# Patient Record
Sex: MALE | Race: WHITE | NOT HISPANIC OR LATINO | Employment: OTHER | URBAN - NONMETROPOLITAN AREA
[De-identification: names, ages, dates, MRNs, and addresses within clinical notes are randomized per-mention and may not be internally consistent; named-entity substitution may affect disease eponyms.]

---

## 2019-07-16 ENCOUNTER — OFFICE VISIT (OUTPATIENT)
Dept: CARDIOLOGY | Facility: CLINIC | Age: 79
End: 2019-07-16

## 2019-07-16 VITALS — WEIGHT: 206.8 LBS | OXYGEN SATURATION: 95 % | BODY MASS INDEX: 25.71 KG/M2 | HEIGHT: 75 IN

## 2019-07-16 DIAGNOSIS — R00.2 PALPITATIONS: ICD-10-CM

## 2019-07-16 DIAGNOSIS — R55 SYNCOPE, NEAR: ICD-10-CM

## 2019-07-16 DIAGNOSIS — R42 DIZZINESS: ICD-10-CM

## 2019-07-16 DIAGNOSIS — R06.02 SOB (SHORTNESS OF BREATH): Primary | ICD-10-CM

## 2019-07-16 PROCEDURE — 99204 OFFICE O/P NEW MOD 45 MIN: CPT | Performed by: PHYSICIAN ASSISTANT

## 2019-07-16 PROCEDURE — 93000 ELECTROCARDIOGRAM COMPLETE: CPT | Performed by: PHYSICIAN ASSISTANT

## 2019-07-16 RX ORDER — CELECOXIB 200 MG/1
200 CAPSULE ORAL DAILY
COMMUNITY

## 2019-07-16 RX ORDER — DOXAZOSIN MESYLATE 4 MG/1
4 TABLET ORAL DAILY
COMMUNITY
End: 2021-04-27

## 2019-07-16 RX ORDER — OMEPRAZOLE 40 MG/1
40 CAPSULE, DELAYED RELEASE ORAL DAILY
COMMUNITY

## 2019-07-16 RX ORDER — LANOLIN ALCOHOL/MO/W.PET/CERES
1 CREAM (GRAM) TOPICAL DAILY
COMMUNITY

## 2019-07-16 RX ORDER — FINASTERIDE 5 MG/1
5 TABLET, FILM COATED ORAL DAILY
COMMUNITY

## 2019-07-16 NOTE — PROGRESS NOTES
Subjective   Marcos Ordaz is a 79 y.o. male     Chief Complaint   Patient presents with   • Establish Care   • Syncope   • Chest Pain   • Shortness of Breath       HPI    The patient presents in today for initial evaluation.  The patient is referred to the clinic in the setting of exertional dizziness and exertional dyspnea.  The patient denies cardiovascular history.  He has chronic issues with orthostasis/dizziness.  With sudden position change when first standing in the morning, he will have lightheadedness.  This lasts only several seconds and resolves completely.  Current exertional episodes are completely different than those.  He tells me that when exerting, particularly when outside in the heat, he will have onset of weakness.  He then develops dizziness.  He has had no syncope with that.  Occasionally he will have smothering but reports no chest discomfort or tightness.  His symptoms persist until he rests.  He must rest for several minutes and symptoms will resolve spontaneously.  He has some degree of mild weakness which is residual, but this eventually resolves over several minutes as well.  Occasionally, he will have associated palpitations.  Otherwise, the patient has had no PND orthopnea.  He has never had robin syncope.  He reports recent laboratories including a chemistry panel, hematologic parameters, and thyroid studies which were normal.  Lipid parameters were normal as well.  The patient did have orthostatic blood pressure checks today which suggest a mild degree of orthostasis.  Again symptomatically the patient tells me this is nonproblematic for him.    Current Outpatient Medications   Medication Sig Dispense Refill   • aspirin 81 MG tablet Take 81 mg by mouth Daily.     • celecoxib (CeleBREX) 200 MG capsule Take 200 mg by mouth Daily.     • doxazosin (CARDURA) 4 MG tablet Take 4 mg by mouth Daily.     • finasteride (PROSCAR) 5 MG tablet Take 5 mg by mouth Daily.     • Glucosamine-Chondroit-Vit  C-Mn (GLUCOSAMINE 1500 COMPLEX PO) Take 1 tablet by mouth Daily.     • omeprazole (priLOSEC) 40 MG capsule Take 40 mg by mouth Daily.     • vitamin B-12 (CYANOCOBALAMIN) 1000 MCG tablet Take 1 tablet by mouth Daily.       No current facility-administered medications for this visit.        Codeine    Past Medical History:   Diagnosis Date   • Elevated prostate specific antigen (PSA)        Social History     Socioeconomic History   • Marital status:      Spouse name: Not on file   • Number of children: Not on file   • Years of education: Not on file   • Highest education level: Not on file   Tobacco Use   • Smoking status: Former Smoker     Packs/day: 1.00     Years: 20.00     Pack years: 20.00     Types: Cigarettes   • Smokeless tobacco: Current User     Types: Chew   Substance and Sexual Activity   • Alcohol use: No     Frequency: Never   • Drug use: No   • Sexual activity: Defer       No family history on file.    Review of Systems   Constitutional: Positive for fatigue (easily fatigued).   HENT: Negative.  Negative for congestion, rhinorrhea and sneezing.    Eyes: Positive for visual disturbance (wears glasses PRN).   Respiratory: Positive for chest tightness (chest tightness at times) and shortness of breath (easily SOA ; worse on exertion ). Negative for cough and wheezing.    Cardiovascular: Positive for chest pain (precordial pain). Negative for palpitations and leg swelling.   Gastrointestinal: Negative.  Negative for abdominal pain, nausea and vomiting.   Endocrine: Negative.  Negative for cold intolerance and heat intolerance.   Genitourinary: Negative.  Negative for difficulty urinating, frequency and urgency.   Musculoskeletal: Positive for arthralgias (joints) and back pain (back pain from arthritis). Negative for neck pain and neck stiffness.   Skin: Negative.  Negative for rash and wound.   Allergic/Immunologic: Negative.  Negative for environmental allergies and food allergies.   Neurological:  "Positive for dizziness (occasional dizziness) and light-headedness (occasional light headedness). Negative for syncope, weakness and headaches.   Hematological: Bruises/bleeds easily (bruises and bleeds easily).   Psychiatric/Behavioral: Positive for agitation (easily agitated). Negative for confusion and sleep disturbance (denies waking up smothering/SOA). The patient is nervous/anxious (easily nervous/anxious).        Objective     Vitals:    07/16/19 1354 07/16/19 1357 07/16/19 1359   SpO2: 98% 96% 95%   Weight: 93.8 kg (206 lb 12.8 oz)     Height: 190.5 cm (75\")          Ht 190.5 cm (75\")   Wt 93.8 kg (206 lb 12.8 oz)   SpO2 95%   BMI 25.85 kg/m²      Lab Results (most recent)     None          Physical Exam   Constitutional: He is oriented to person, place, and time. He appears well-developed and well-nourished. No distress.   HENT:   Head: Normocephalic and atraumatic.   Eyes: Conjunctivae and EOM are normal. Pupils are equal, round, and reactive to light.   Neck: Normal range of motion. Neck supple. No JVD present. No tracheal deviation present.   Cardiovascular: Normal rate, regular rhythm, normal heart sounds and intact distal pulses.   Pulmonary/Chest: Effort normal and breath sounds normal.   Abdominal: Soft. Bowel sounds are normal. He exhibits no distension and no mass. There is no tenderness. There is no rebound and no guarding.   Musculoskeletal: Normal range of motion. He exhibits no edema, tenderness or deformity.   Neurological: He is alert and oriented to person, place, and time.   Skin: Skin is warm and dry. No rash noted. No erythema. No pallor.   Psychiatric: He has a normal mood and affect. His behavior is normal. Judgment and thought content normal.   Nursing note and vitals reviewed.      Procedure     ECG 12 Lead  Date/Time: 7/16/2019 2:09 PM  Performed by: Raheem Tellez PA  Authorized by: Raheem Tellez PA   Previous ECG: no previous ECG available  Comments: SOA    Sinus rhythm " with PACs, normal axis, no acute changes noted.                 Assessment/Plan      Diagnosis Plan   1. SOB (shortness of breath)  ECG 12 Lead    Adult Transthoracic Echo Complete W/ Cont if Necessary Per Protocol    Holter Monitor - 24 Hour    Stress Test With Myocardial Perfusion One Day   2. Syncope, near  ECG 12 Lead    Adult Transthoracic Echo Complete W/ Cont if Necessary Per Protocol    Holter Monitor - 24 Hour    Stress Test With Myocardial Perfusion One Day   3. Palpitations  Adult Transthoracic Echo Complete W/ Cont if Necessary Per Protocol    Holter Monitor - 24 Hour    Stress Test With Myocardial Perfusion One Day   4. Dizziness  Adult Transthoracic Echo Complete W/ Cont if Necessary Per Protocol    Holter Monitor - 24 Hour    Stress Test With Myocardial Perfusion One Day     1.  Because of symptoms of exertional dizziness as well as exertional dyspnea, I feel he needs ischemia assessment.  We will schedule for treadmill nuclear stress test which will allow us to evaluate for ischemia, but also allow us to screen for dysrhythmic substrates occurring with exertion.  We can evaluate his exertional capacity and response to activity at that time as well by telemetry.    2.  I would like to schedule for an echo so that we may evaluate him structurally, particularly given symptoms as above.      3.  We will schedule for Holter to evaluate for dysrhythmic substrates potentially contributing to symptoms.    4.  I did review that by check today, he is mildly orthostatic.  I feel that this is likely related to his alpha blockade therapy.  He feels that that is minimal enough and that he is benefiting enough from the doxazosin with regards to his BPH that he would not want to change that.  Should that worsen, we reviewed that he could go down to a 1 mg dose, as he is currently taking 4 mg half a tab daily.  He acknowledges that he will contact us or Dr. Nielson should those symptoms worsen.    5.  For now, I will  make no changes in medications.  We will see him back after the above studies and recommended further at that time.  He will call for any issues prior to follow-up.               Marcos Ordaz is a current smokeless tobacco user.  He currently smokes and chews 1 pack of cigarettes and can of smokless tobacco per day for a duration of 15 years. I have educated him on the risk of diseases from using tobacco products such as cancer, COPD and heart diease.     I advised him to quit and he is not willing to quit.    I spent 3  minutes counseling the patient.         Patient's Body mass index is 25.85 kg/m². BMI is above normal parameters. Recommendations include: educational material and referral to primary care.           Electronically signed by:

## 2019-07-16 NOTE — PATIENT INSTRUCTIONS
Smokeless Tobacco Information, Adult  Tobacco use is one of the leading causes of cancer and other chronic health problems. Smokeless tobacco is tobacco that is put directly into the mouth instead of being smoked. It may also be called chewing tobacco or snuff. Smokeless tobacco is made from the leaves of tobacco plants and it comes in several forms:  · Loose, dry leaves, plugs, or twists.  · Moist pouches.  · Dissolving lozenges or strips.    Chewing, sucking, or holding the tobacco in your mouth causes your mouth to make more saliva. The saliva mixes with the tobacco to make “tobacco juice” that is swallowed or spit out.  How can smokeless tobacco affect me?  Using smokeless tobacco:  · Increases your risk of developing cancer. Smokeless tobacco contains at least 28 different types of cancer-causing chemicals (carcinogens).  · Increases your chances of developing other long-term health problems, including high blood pressure, heart disease, stroke, and dental problems.  · Can make you become addicted. Nicotine is one of the chemicals in tobacco. When you chew tobacco, you absorb nicotine from the tobacco juice. This can make you feel more alert than usual.  · Can cause problems with pregnancy. Pregnant women who use smokeless tobacco are more likely to miscarry or deliver a baby too early (premature delivery).  · Can affect the appearance and health of your mouth. Using smokeless tobacco may cause bad breath, yellow-brown teeth, mouth sores, cracking and bleeding lips, gum recession, and lesions on the soft tissues of your mouth (leukoplakia).    What are the benefits of not using smokeless tobacco?  The benefits of not using smokeless tobacco include:  · A healthy mind because:  ? You avoid addiction.  · A healthy body because:  ? You avoid dental problems.  ? You promote healthy pregnancy.  ? You avoid long-term health problems.  · A healthy wallet because:  ? You avoid costs of buying tobacco.  ? You avoid  health care costs in the future.  · A healthy family because:  ? You avoid accidental poisoning of children in your household.    What can happen if I continue to use smokeless tobacco?  If you continue to use smokeless tobacco, you will increase your risk for developing certain cancers. These include:  · Tongue.  · Lips, mouth, and gums.  · Throat (esophagus) and voice box (larynx).  · Stomach.  · Pancreas.  · Bladder.  · Colon.    Long-term use of smokeless tobacco can also lead to:  · High blood pressure, heart disease, and stroke.  · Gum disease, gum recession, and bone loss around the teeth.  · Tooth decay.    How do I quit using smokeless tobacco?  Quitting the use of smokeless tobacco can be hard, but it can be done. Follow these steps:  · Pick a date to quit. Set a date within the next two weeks. This gives you time to prepare.  · Write down the reasons why you are quitting. Keep this list in places where you will see it often, such as on your bathroom mirror or in your car or wallet.  · Identify the people, places, things, and activities that make you want to use tobacco (triggers) and avoid them.  · Get rid of any tobacco you have and remove any tobacco smells. To do this:  ? Throw away all containers of tobacco at home, at work, and in your car.  ? Throw away any other items that you use regularly when you chew tobacco.  ? Clean your car and make sure to remove all tobacco-related items.  ? Clean your home, including curtains and carpets.  · Tell your family, friends, and coworkers that you are quitting. This can make quitting easier.  · Ask your health care provider for help quitting smokeless tobacco. This may involve treatment. Find out what treatment options are covered by your health insurance.  · Keep track of how many days have passed since you quit. Remembering how long and hard you have worked to quit can help you avoid using tobacco again.    Where can I get support?  Ask your health care  provider if there is a local support group for quitting smokeless tobacco.  Where can I get more information?  You can learn more about the risks of using smokeless tobacco and the benefits of quitting from these sources:  · National Cancer [place]: www.cancer.gov  · American Cancer Society: www.cancer.org    When should I seek medical care?  Seek medical care if you have:  · White or other discolored patches in your mouth.  · Difficulty swallowing.  · A change in your voice.  · Unexplained weight loss.  · Stomach pain, nausea, or vomiting.    Summary  · Smokeless tobacco contains at least 28 different chemicals that are known to cause cancer (carcinogen).  · Nicotine is an addictive chemical in smokeless tobacco.  · When you quit using smokeless tobacco, you lower your risk of developing cancer.  This information is not intended to replace advice given to you by your health care provider. Make sure you discuss any questions you have with your health care provider.  Document Released: 05/21/2012 Document Revised: 08/03/2018 Document Reviewed: 07/29/2016  [employer] Interactive Patient Education © 2019 [employer]  BMI for Adults  Body mass index (BMI) is a number that is calculated from a person's weight and height. In most adults, the number is used to find how much of an adult's weight is made up of fat. BMI is not as accurate as a direct measure of body fat.  How is BMI calculated?  BMI is calculated by dividing weight in kilograms by height in meters squared. It can also be calculated by dividing weight in pounds by height in inches squared, then multiplying the resulting number by 703. Charts are available to help you find your BMI quickly and easily without doing this calculation.  How is BMI interpreted?  Health care professionals use BMI charts to identify whether an adult is underweight, at a normal weight, or overweight based on the following guidelines:  · Underweight: BMI less than 18.5.  · Normal  weight: BMI between 18.5 and 24.9.  · Overweight: BMI between 25 and 29.9.  · Obese: BMI of 30 and above.    BMI is usually interpreted the same for males and females.  Weight includes both fat and muscle, so someone with a muscular build, such as an athlete, may have a BMI that is higher than 24.9. In cases like these, BMI may not accurately depict body fat. To determine if excess body fat is the cause of a BMI of 25 or higher, further assessments may need to be done by a health care provider.  Why is BMI a useful tool?  BMI is used to identify a possible weight problem that may be related to a medical problem or may increase the risk for medical problems. BMI can also be used to promote changes to reach a healthy weight.  This information is not intended to replace advice given to you by your health care provider. Make sure you discuss any questions you have with your health care provider.  Document Released: 08/29/2005 Document Revised: 04/27/2017 Document Reviewed: 05/15/2015  [employer] Interactive Patient Education © 2018 [employer]

## 2019-08-07 ENCOUNTER — HOSPITAL ENCOUNTER (OUTPATIENT)
Dept: CARDIOLOGY | Facility: HOSPITAL | Age: 79
Discharge: HOME | End: 2019-08-07

## 2019-08-07 DIAGNOSIS — R42 DIZZINESS: ICD-10-CM

## 2019-08-07 DIAGNOSIS — R00.2 PALPITATIONS: ICD-10-CM

## 2019-08-07 DIAGNOSIS — R06.02 SOB (SHORTNESS OF BREATH): ICD-10-CM

## 2019-08-07 DIAGNOSIS — R55 SYNCOPE, NEAR: ICD-10-CM

## 2019-08-07 PROCEDURE — 93306 TTE W/DOPPLER COMPLETE: CPT

## 2019-08-07 PROCEDURE — 0 TECHNETIUM SESTAMIBI: Performed by: INTERNAL MEDICINE

## 2019-08-07 PROCEDURE — A9500 TC99M SESTAMIBI: HCPCS | Performed by: INTERNAL MEDICINE

## 2019-08-07 PROCEDURE — 78452 HT MUSCLE IMAGE SPECT MULT: CPT | Performed by: INTERNAL MEDICINE

## 2019-08-07 PROCEDURE — 93018 CV STRESS TEST I&R ONLY: CPT | Performed by: INTERNAL MEDICINE

## 2019-08-07 PROCEDURE — 25010000002 REGADENOSON 0.4 MG/5ML SOLUTION: Performed by: INTERNAL MEDICINE

## 2019-08-07 PROCEDURE — 93306 TTE W/DOPPLER COMPLETE: CPT | Performed by: INTERNAL MEDICINE

## 2019-08-07 PROCEDURE — 78452 HT MUSCLE IMAGE SPECT MULT: CPT

## 2019-08-07 PROCEDURE — 93017 CV STRESS TEST TRACING ONLY: CPT

## 2019-08-07 RX ADMIN — TECHNETIUM TC 99M SESTAMIBI 1 DOSE: 1 INJECTION INTRAVENOUS at 12:09

## 2019-08-07 RX ADMIN — REGADENOSON 0.4 MG: 0.08 INJECTION, SOLUTION INTRAVENOUS at 13:27

## 2019-08-07 RX ADMIN — TECHNETIUM TC 99M SESTAMIBI 1 DOSE: 1 INJECTION INTRAVENOUS at 13:27

## 2019-08-12 ENCOUNTER — TELEPHONE (OUTPATIENT)
Dept: CARDIOLOGY | Facility: CLINIC | Age: 79
End: 2019-08-12

## 2019-08-12 LAB
BH CV NUCLEAR PRIOR STUDY: 3
BH CV STRESS COMMENTS STAGE 1: NORMAL
BH CV STRESS DOSE REGADENOSON STAGE 1: 0.4
BH CV STRESS DURATION MIN STAGE 1: 0
BH CV STRESS DURATION SEC STAGE 1: 10
BH CV STRESS PROTOCOL 1: NORMAL
BH CV STRESS RECOVERY BP: NORMAL MMHG
BH CV STRESS RECOVERY HR: 68 BPM
BH CV STRESS STAGE 1: 1
MAXIMAL PREDICTED HEART RATE: 140 BPM
PERCENT MAX PREDICTED HR: 56.43 %
STRESS BASELINE BP: NORMAL MMHG
STRESS BASELINE HR: 51 BPM
STRESS PERCENT HR: 66 %
STRESS POST PEAK BP: NORMAL MMHG
STRESS POST PEAK HR: 79 BPM
STRESS TARGET HR: 119 BPM

## 2019-08-12 NOTE — TELEPHONE ENCOUNTER
Patient returned call. He is aware of stress test results. Follow up moved to 8/14/19 at 11:15 to meet recommendations. Estefanía Alfaro MA

## 2019-08-12 NOTE — TELEPHONE ENCOUNTER
Patient called office back and was scheduled by Estefanía to come into office to review test results with provider. -BH;CCMA    Result Notes for Stress Test With Myocardial Perfusion One Day     Notes recorded by Britt Ramirez MA on 8/12/2019 at 10:53 AM EDT  Called patient and left VM to return call. Needs sooner appt, provider has opening at 130 PM today. -SERA;CCMA  Notes recorded by Britt Ramirez MA on 8/12/2019 at 9:44 AM EDT  Message sent to front office staff req sooner appt. -BH;CCMA  Notes recorded by Raheem Tellez PA on 8/12/2019 at 9:40 AM EDT  Consider follow-up in the next 2 to 3 weeks.   Stress Test With Myocardial Perfusion One Day   Order: [id number]   Status:  Final result   Visible to patient:  No (Not Released) Dx:  Palpitations; Dizziness; SOB (shortne...   Details     Reading Physician Reading Date Result Priority   Deny Samson MD 8/7/2019 Routine   Deny Samson MD 8/7/2019    Deny Samson MD 8/7/2019       Result Text     1.  Posterior lateral ischemia.     2.  Preserved post stress ejection fraction of 62% with no focal wall motion abnormalities.     3.  No evidence of pharmacologically-induced ischemic dilation nor of increased lung uptake of radiopharmaceutical.

## 2019-08-12 NOTE — TELEPHONE ENCOUNTER
Called patient and left voice mail to return call , needing patient to come into office sooner than scheduled due to abn stress test results. -BH;CCMA    Result Notes for Stress Test With Myocardial Perfusion One Day     Notes recorded by Britt Ramirez MA on 8/12/2019 at 9:44 AM EDT  Message sent to front office staff req sooner appt. -BH;CCMA  ------    Notes recorded by Raheem Tellez PA on 8/12/2019 at 9:40 AM EDT  Consider follow-up in the next 2 to 3 weeks.   Stress Test With Myocardial Perfusion One Day   Order: [id number]   Status:  Final result   Visible to patient:  No (Not Released) Dx:  Palpitations; Dizziness; SOB (shortne...   Details     Reading Physician Reading Date Result Priority   Deny Samson MD 8/7/2019 Routine   Deny Samson MD 8/7/2019    Deny Samson MD 8/7/2019       Result Text     1.  Posterior lateral ischemia.     2.  Preserved post stress ejection fraction of 62% with no focal wall motion abnormalities.     3.  No evidence of pharmacologically-induced ischemic dilation nor of increased lung uptake of radiopharmaceutical.

## 2019-08-14 ENCOUNTER — OFFICE VISIT (OUTPATIENT)
Dept: CARDIOLOGY | Facility: CLINIC | Age: 79
End: 2019-08-14

## 2019-08-14 VITALS
DIASTOLIC BLOOD PRESSURE: 66 MMHG | OXYGEN SATURATION: 97 % | HEART RATE: 84 BPM | SYSTOLIC BLOOD PRESSURE: 111 MMHG | BODY MASS INDEX: 25.89 KG/M2 | WEIGHT: 208.2 LBS | HEIGHT: 75 IN

## 2019-08-14 DIAGNOSIS — R55 PRE-SYNCOPE: ICD-10-CM

## 2019-08-14 DIAGNOSIS — R42 DIZZINESS: ICD-10-CM

## 2019-08-14 DIAGNOSIS — R06.02 SOB (SHORTNESS OF BREATH): Primary | ICD-10-CM

## 2019-08-14 LAB
BH CV ECHO MEAS - ACS: 2.4 CM
BH CV ECHO MEAS - AO MEAN PG: 2.5 MMHG
BH CV ECHO MEAS - AO ROOT AREA (BSA CORRECTED): 1.4
BH CV ECHO MEAS - AO ROOT AREA: 7.5 CM^2
BH CV ECHO MEAS - AO ROOT DIAM: 3.1 CM
BH CV ECHO MEAS - AO V2 MEAN: 73.4 CM/SEC
BH CV ECHO MEAS - AO V2 VTI: 24.6 CM
BH CV ECHO MEAS - BSA(HAYCOCK): 2.2 M^2
BH CV ECHO MEAS - BSA: 2.2 M^2
BH CV ECHO MEAS - BZI_BMI: 25.7 KILOGRAMS/M^2
BH CV ECHO MEAS - BZI_METRIC_HEIGHT: 190.5 CM
BH CV ECHO MEAS - BZI_METRIC_WEIGHT: 93.4 KG
BH CV ECHO MEAS - EDV(CUBED): 59.2 ML
BH CV ECHO MEAS - EDV(MOD-SP4): 82 ML
BH CV ECHO MEAS - EDV(TEICH): 65.8 ML
BH CV ECHO MEAS - EF(CUBED): 69.1 %
BH CV ECHO MEAS - EF(MOD-SP4): 74.4 %
BH CV ECHO MEAS - EF(TEICH): 61.3 %
BH CV ECHO MEAS - ESV(CUBED): 18.3 ML
BH CV ECHO MEAS - ESV(MOD-SP4): 21 ML
BH CV ECHO MEAS - ESV(TEICH): 25.4 ML
BH CV ECHO MEAS - FS: 32.4 %
BH CV ECHO MEAS - IVS/LVPW: 1.4
BH CV ECHO MEAS - IVSD: 1.4 CM
BH CV ECHO MEAS - LA DIMENSION: 3.4 CM
BH CV ECHO MEAS - LA/AO: 1.1
BH CV ECHO MEAS - LV DIASTOLIC VOL/BSA (35-75): 36.9 ML/M^2
BH CV ECHO MEAS - LV IVRT: 0.15 SEC
BH CV ECHO MEAS - LV MASS(C)D: 156.3 GRAMS
BH CV ECHO MEAS - LV MASS(C)DI: 70.4 GRAMS/M^2
BH CV ECHO MEAS - LV SYSTOLIC VOL/BSA (12-30): 9.5 ML/M^2
BH CV ECHO MEAS - LVIDD: 3.9 CM
BH CV ECHO MEAS - LVIDS: 2.6 CM
BH CV ECHO MEAS - LVLD AP4: 6.9 CM
BH CV ECHO MEAS - LVLS AP4: 5.5 CM
BH CV ECHO MEAS - LVOT AREA (M): 3.8 CM^2
BH CV ECHO MEAS - LVOT AREA: 3.9 CM^2
BH CV ECHO MEAS - LVOT DIAM: 2.2 CM
BH CV ECHO MEAS - LVPWD: 1 CM
BH CV ECHO MEAS - MV A MAX VEL: 106.6 CM/SEC
BH CV ECHO MEAS - MV DEC SLOPE: 335.2 CM/SEC^2
BH CV ECHO MEAS - MV E MAX VEL: 82.4 CM/SEC
BH CV ECHO MEAS - MV E/A: 0.77
BH CV ECHO MEAS - RVDD: 3.5 CM
BH CV ECHO MEAS - SI(AO): 82.7 ML/M^2
BH CV ECHO MEAS - SI(CUBED): 18.4 ML/M^2
BH CV ECHO MEAS - SI(MOD-SP4): 27.5 ML/M^2
BH CV ECHO MEAS - SI(TEICH): 18.1 ML/M^2
BH CV ECHO MEAS - SV(AO): 183.7 ML
BH CV ECHO MEAS - SV(CUBED): 40.9 ML
BH CV ECHO MEAS - SV(MOD-SP4): 61 ML
BH CV ECHO MEAS - SV(TEICH): 40.3 ML
MAXIMAL PREDICTED HEART RATE: 140 BPM
STRESS TARGET HR: 119 BPM

## 2019-08-14 PROCEDURE — 99213 OFFICE O/P EST LOW 20 MIN: CPT | Performed by: PHYSICIAN ASSISTANT

## 2019-08-14 NOTE — PROGRESS NOTES
Problem list     Subjective   Marcos Ordaz is a 79 y.o. male     Chief Complaint   Patient presents with   • Palpitations   • Shortness of Breath       HPI  The patient presents back today to review study results.  We had seen this gentleman initially in the setting of exertional dyspnea, episodes of dizziness, and presyncope.  It was felt that at least a fair portion of his episodes of dizziness and even his episode of presyncope possibly could have been related to orthostasis.  We discussed consideration for adjusting doxazosin therapy, however as the patient felt well enough on that medication he did not want to alter therapy dosing.  He was scheduled for cardiac testing and presents today to review that.  Nuclear stress test suggested posterior lateral wall ischemia.  Post stress EF was preserved.  Echocardiogram suggested preserved systolic function with no significant valvular issues.  His monitor indicated sinus rhythm with sinus tachycardia and and a short episode of SVT but no significant or certainly no sustained dysrhythmic activity.  Currently, the patient has no chest pain.  He has stable dyspnea.  He had stable episodes of orthostasis and dizziness otherwise.  He has no further complaints.    Current Outpatient Medications   Medication Sig Dispense Refill   • aspirin 81 MG tablet Take 81 mg by mouth Daily.     • celecoxib (CeleBREX) 200 MG capsule Take 200 mg by mouth Daily.     • doxazosin (CARDURA) 4 MG tablet Take 4 mg by mouth Daily.     • finasteride (PROSCAR) 5 MG tablet Take 5 mg by mouth Daily.     • Glucosamine-Chondroit-Vit C-Mn (GLUCOSAMINE 1500 COMPLEX PO) Take 1 tablet by mouth Daily.     • omeprazole (priLOSEC) 40 MG capsule Take 40 mg by mouth Daily.     • vitamin B-12 (CYANOCOBALAMIN) 1000 MCG tablet Take 1 tablet by mouth Daily.       No current facility-administered medications for this visit.        Codeine    Past Medical History:   Diagnosis Date   • Elevated prostate specific antigen  (PSA)        Social History     Socioeconomic History   • Marital status:      Spouse name: Not on file   • Number of children: Not on file   • Years of education: Not on file   • Highest education level: Not on file   Tobacco Use   • Smoking status: Former Smoker     Packs/day: 1.00     Years: 20.00     Pack years: 20.00     Types: Cigarettes   • Smokeless tobacco: Current User     Types: Chew   Substance and Sexual Activity   • Alcohol use: No     Frequency: Never   • Drug use: No   • Sexual activity: Defer       Family History   Problem Relation Age of Onset   • No Known Problems Mother    • No Known Problems Father    • No Known Problems Sister    • No Known Problems Brother        Review of Systems   Constitutional: Positive for fatigue (easily fatigued with any amount of exertion).   HENT: Negative.  Negative for congestion, rhinorrhea and sneezing.    Eyes: Positive for visual disturbance (wears glasses PRN).   Respiratory: Positive for shortness of breath (easily SOA ; worse on exertion ). Negative for cough, chest tightness and wheezing.    Cardiovascular: Positive for palpitations (occasional palpitations). Negative for chest pain and leg swelling.   Gastrointestinal: Negative.  Negative for abdominal pain, nausea and vomiting.   Endocrine: Negative.  Negative for cold intolerance and heat intolerance.   Genitourinary: Negative.  Negative for difficulty urinating, frequency and urgency.   Musculoskeletal: Negative.  Negative for arthralgias, back pain, neck pain and neck stiffness.   Skin: Negative.  Negative for rash and wound.   Allergic/Immunologic: Negative.  Negative for environmental allergies and food allergies.   Neurological: Negative.  Negative for dizziness, syncope, weakness, light-headedness and headaches.   Hematological: Bruises/bleeds easily (bruises and bleeds easily).   Psychiatric/Behavioral: Negative.  Negative for agitation, confusion and sleep disturbance (denies waking up  "smohtering/SOA). The patient is not nervous/anxious.        Objective   Vitals:    08/14/19 1114   BP: 111/66   BP Location: Left arm   Patient Position: Sitting   Pulse: 84   SpO2: 97%   Weight: 94.4 kg (208 lb 3.2 oz)   Height: 190.5 cm (75\")      /66 (BP Location: Left arm, Patient Position: Sitting)   Pulse 84   Ht 190.5 cm (75\")   Wt 94.4 kg (208 lb 3.2 oz)   SpO2 97%   BMI 26.02 kg/m²    Lab Results (most recent)     None        Physical Exam   Constitutional: He is oriented to person, place, and time. He appears well-developed and well-nourished. No distress.   HENT:   Head: Normocephalic and atraumatic.   Eyes: Conjunctivae and EOM are normal. Pupils are equal, round, and reactive to light.   Neck: Normal range of motion. Neck supple. No JVD present. No tracheal deviation present.   Cardiovascular: Normal rate, regular rhythm, normal heart sounds and intact distal pulses.   Pulmonary/Chest: Effort normal and breath sounds normal.   Abdominal: Soft. Bowel sounds are normal. He exhibits no distension and no mass. There is no tenderness. There is no rebound and no guarding.   Musculoskeletal: Normal range of motion. He exhibits no edema, tenderness or deformity.   Neurological: He is alert and oriented to person, place, and time.   Skin: Skin is warm and dry. No rash noted. No erythema. No pallor.   Psychiatric: He has a normal mood and affect. His behavior is normal. Judgment and thought content normal.   Nursing note and vitals reviewed.        Procedure   Procedures       Assessment/Plan      Diagnosis Plan   1. SOB (shortness of breath)     2. Dizziness     3. Pre-syncope       1.  The patient's stress test suggested posterior lateral wall ischemia.  I have reviewed with him consideration for invasive evaluation/catheterization.  He feels that symptoms of shortness of air and presyncopal episodes are minimal enough at this time that he does not want to pursue catheterization.  We have discussed " alternate therapies for medical management.  We discussed statin therapy.  He is very hesitant for statin therapy and would like to hold on that for now.  He will continue aspirin therapy.  He will call to us for any breakthrough symptoms of angina, which were reviewed with him today.  Again, for now, he would like to hold on any further work-up unless clinical course should change.    2.  Otherwise, his echo and monitor were largely benign as outlined above.    3.  The patient continues to describe episodes of dizziness, a large part of which would suggest orthostasis.  We have reviewed physical maneuvers for the same.  I have discussed in the past consideration for decreasing doxazosin therapy to see if symptoms improve.  He feels that the doxazosin is beneficial enough that he would not want to change that.    4.  At this time, we will see him back on a 3 to 4-month follow-up and reevaluate symptoms at that time.  For change in clinical course he will call.  We would have to consider catheterization at that time.           Marcos Ordaz is a current smokeless tobacco user.  He currently chews 1 pack of loose leaf tobacco per day for a duration of 30 years. I have educated him on the risk of diseases from using tobacco products such as cancer, COPD and heart diease.     I advised him to quit and he is not willing to quit.    I spent 3  minutes counseling the patient.         Patient's Body mass index is 26.02 kg/m². BMI is above normal parameters. Recommendations include: educational material and referral to primary care.             Electronically signed by:

## 2019-08-14 NOTE — PATIENT INSTRUCTIONS
Smokeless Tobacco Information, Adult  Tobacco use is one of the leading causes of cancer and other chronic health problems. Smokeless tobacco is tobacco that is put directly into the mouth instead of being smoked. It may also be called chewing tobacco or snuff. Smokeless tobacco is made from the leaves of tobacco plants and it comes in several forms:  · Loose, dry leaves, plugs, or twists.  · Moist pouches.  · Dissolving lozenges or strips.  Chewing, sucking, or holding the tobacco in your mouth causes your mouth to make more saliva. The saliva mixes with the tobacco to make “tobacco juice” that is swallowed or spit out.  How can smokeless tobacco affect me?  Using smokeless tobacco:  · Increases your risk of developing cancer. Smokeless tobacco contains at least 28 different types of cancer-causing chemicals (carcinogens).  · Increases your chances of developing other long-term health problems, including high blood pressure, heart disease, stroke, and dental problems.  · Can make you become addicted. Nicotine is one of the chemicals in tobacco. When you chew tobacco, you absorb nicotine from the tobacco juice. This can make you feel more alert than usual.  · Can cause problems with pregnancy. Pregnant women who use smokeless tobacco are more likely to miscarry or deliver a baby too early (premature delivery).  · Can affect the appearance and health of your mouth. Using smokeless tobacco may cause bad breath, yellow-brown teeth, mouth sores, cracking and bleeding lips, gum recession, and lesions on the soft tissues of your mouth (leukoplakia).  What are the benefits of not using smokeless tobacco?  The benefits of not using smokeless tobacco include:  · A healthy mind because:  ? You avoid addiction.  · A healthy body because:  ? You avoid dental problems.  ? You promote healthy pregnancy.  ? You avoid long-term health problems.  · A healthy wallet because:  ? You avoid costs of buying tobacco.  ? You avoid health  care costs in the future.  · A healthy family because:  ? You avoid accidental poisoning of children in your household.  What can happen if I continue to use smokeless tobacco?  If you continue to use smokeless tobacco, you will increase your risk for developing certain cancers. These include:  · Tongue.  · Lips, mouth, and gums.  · Throat (esophagus) and voice box (larynx).  · Stomach.  · Pancreas.  · Bladder.  · Colon.  Long-term use of smokeless tobacco can also lead to:  · High blood pressure, heart disease, and stroke.  · Gum disease, gum recession, and bone loss around the teeth.  · Tooth decay.  How do I quit using smokeless tobacco?  Quitting the use of smokeless tobacco can be hard, but it can be done. Follow these steps:  · Pick a date to quit. Set a date within the next two weeks. This gives you time to prepare.  · Write down the reasons why you are quitting. Keep this list in places where you will see it often, such as on your bathroom mirror or in your car or wallet.  · Identify the people, places, things, and activities that make you want to use tobacco (triggers) and avoid them.  · Get rid of any tobacco you have and remove any tobacco smells. To do this:  ? Throw away all containers of tobacco at home, at work, and in your car.  ? Throw away any other items that you use regularly when you chew tobacco.  ? Clean your car and make sure to remove all tobacco-related items.  ? Clean your home, including curtains and carpets.  · Tell your family, friends, and coworkers that you are quitting. This can make quitting easier.  · Ask your health care provider for help quitting smokeless tobacco. This may involve treatment. Find out what treatment options are covered by your health insurance.  · Keep track of how many days have passed since you quit. Remembering how long and hard you have worked to quit can help you avoid using tobacco again.  Where can I get support?  Ask your health care provider if there is  a local support group for quitting smokeless tobacco.  Where can I get more information?  You can learn more about the risks of using smokeless tobacco and the benefits of quitting from these sources:  · National Cancer [place]: www.cancer.gov  · American Cancer Society: www.cancer.org  When should I seek medical care?  Seek medical care if you have:  · White or other discolored patches in your mouth.  · Difficulty swallowing.  · A change in your voice.  · Unexplained weight loss.  · Stomach pain, nausea, or vomiting.  Summary  · Smokeless tobacco contains at least 28 different chemicals that are known to cause cancer (carcinogen).  · Nicotine is an addictive chemical in smokeless tobacco.  · When you quit using smokeless tobacco, you lower your risk of developing cancer.  This information is not intended to replace advice given to you by your health care provider. Make sure you discuss any questions you have with your health care provider.  Document Released: 05/21/2012 Document Revised: 08/03/2018 Document Reviewed: 07/29/2016  [employer] Patient Education © 2019 [employer]  BMI for Adults    Body mass index (BMI) is a number that is calculated from a person's weight and height. BMI may help to estimate how much of a person's weight is composed of fat. BMI can help identify those who may be at higher risk for certain medical problems.  How is BMI used with adults?  BMI is used as a screening tool to identify possible weight problems. It is used to check whether a person is obese, overweight, healthy weight, or underweight.  How is BMI calculated?  BMI measures your weight and compares it to your height. This can be done either in English ([place] or metric measurements. Note that charts are available to help you find your BMI quickly and easily without having to do these calculations yourself.  To calculate your BMI in English ([place] measurements, your health care provider will:  1. Measure your  "weight in pounds (lb).  2. Multiply the number of pounds by 703.  ? For example, for a person who weighs 180 lb, multiply that number by 703, which equals 126,540.  3. Measure your height in inches (in). Then multiply that number by itself to get a measurement called \"inches squared.\"  ? For example, for a person who is 70 in tall, the \"inches squared\" measurement is 70 in x 70 in, which equals 4900 inches squared.  4. Divide the total from Step 2 (number of lb x 703) by the total from Step 3 (inches squared): 126,540 ÷ 4900 = 25.8. This is your BMI.  To calculate your BMI in metric measurements, your health care provider will:  1. Measure your weight in kilograms (kg).  2. Measure your height in meters (m). Then multiply that number by itself to get a measurement called \"meters squared.\"  ? For example, for a person who is 1.75 m tall, the \"meters squared\" measurement is 1.75 m x 1.75 m, which is equal to 3.1 meters squared.  3. Divide the number of kilograms (your weight) by the meters squared number. In this example: 70 ÷ 3.1 = 22.6. This is your BMI.  How is BMI interpreted?  To interpret your results, your health care provider will use BMI charts to identify whether you are underweight, normal weight, overweight, or obese. The following guidelines will be used:  · Underweight: BMI less than 18.5.  · Normal weight: BMI between 18.5 and 24.9.  · Overweight: BMI between 25 and 29.9.  · Obese: BMI of 30 and above.  Please note:  · Weight includes both fat and muscle, so someone with a muscular build, such as an athlete, may have a BMI that is higher than 24.9. In cases like these, BMI is not an accurate measure of body fat.  · To determine if excess body fat is the cause of a BMI of 25 or higher, further assessments may need to be done by a health care provider.  · BMI is usually interpreted in the same way for men and women.  Why is BMI a useful tool?  BMI is useful in two ways:  · Identifying a weight problem " that may be related to a medical condition, or that may increase the risk for medical problems.  · Promoting lifestyle and diet changes in order to reach a healthy weight.  Summary  · Body mass index (BMI) is a number that is calculated from a person's weight and height.  · BMI may help to estimate how much of a person's weight is composed of fat. BMI can help identify those who may be at higher risk for certain medical problems.  · BMI can be measured using English measurements or metric measurements.  · To interpret your results, your health care provider will use BMI charts to identify whether you are underweight, normal weight, overweight, or obese.  This information is not intended to replace advice given to you by your health care provider. Make sure you discuss any questions you have with your health care provider.  Document Released: 08/29/2005 Document Revised: 10/31/2018 Document Reviewed: 10/31/2018  [employer] Patient Education © 2019 [employer]

## 2019-12-11 ENCOUNTER — OFFICE VISIT (OUTPATIENT)
Dept: CARDIOLOGY | Facility: CLINIC | Age: 79
End: 2019-12-11

## 2019-12-11 VITALS
HEART RATE: 66 BPM | HEIGHT: 75 IN | OXYGEN SATURATION: 96 % | WEIGHT: 209 LBS | SYSTOLIC BLOOD PRESSURE: 171 MMHG | BODY MASS INDEX: 25.99 KG/M2 | DIASTOLIC BLOOD PRESSURE: 83 MMHG

## 2019-12-11 DIAGNOSIS — R42 DIZZINESS: ICD-10-CM

## 2019-12-11 DIAGNOSIS — R06.02 SOB (SHORTNESS OF BREATH): Primary | ICD-10-CM

## 2019-12-11 DIAGNOSIS — R55 PRE-SYNCOPE: ICD-10-CM

## 2019-12-11 DIAGNOSIS — I10 ESSENTIAL HYPERTENSION: ICD-10-CM

## 2019-12-11 PROCEDURE — 99213 OFFICE O/P EST LOW 20 MIN: CPT | Performed by: PHYSICIAN ASSISTANT

## 2019-12-11 NOTE — PATIENT INSTRUCTIONS
Smokeless Tobacco Information, Adult  Tobacco use is one of the leading causes of cancer and other chronic health problems. Smokeless tobacco is tobacco that is put directly into the mouth instead of being smoked. It may also be called chewing tobacco or snuff. Smokeless tobacco is made from the leaves of tobacco plants and it comes in several forms:  · Loose, dry leaves, plugs, or twists.  · Moist pouches.  · Dissolving lozenges or strips.  Chewing, sucking, or holding the tobacco in your mouth causes your mouth to make more saliva. The saliva mixes with the tobacco to make “tobacco juice” that is swallowed or spit out.  How can smokeless tobacco affect me?  Using smokeless tobacco:  · Increases your risk of developing cancer. Smokeless tobacco contains at least 28 different types of cancer-causing chemicals (carcinogens).  · Increases your chances of developing other long-term health problems, including high blood pressure, heart disease, stroke, and dental problems.  · Can make you become addicted. Nicotine is one of the chemicals in tobacco. When you chew tobacco, you absorb nicotine from the tobacco juice. This can make you feel more alert than usual.  · Can cause problems with pregnancy. Pregnant women who use smokeless tobacco are more likely to miscarry or deliver a baby too early (premature delivery).  · Can affect the appearance and health of your mouth. Using smokeless tobacco may cause bad breath, yellow-brown teeth, mouth sores, cracking and bleeding lips, gum recession, and lesions on the soft tissues of your mouth (leukoplakia).  What are the benefits of not using smokeless tobacco?  The benefits of not using smokeless tobacco include:  · A healthy mind because:  ? You avoid addiction.  · A healthy body because:  ? You avoid dental problems.  ? You promote healthy pregnancy.  ? You avoid long-term health problems.  · A healthy wallet because:  ? You avoid costs of buying tobacco.  ? You avoid health  care costs in the future.  · A healthy family because:  ? You avoid accidental poisoning of children in your household.  What can happen if I continue to use smokeless tobacco?  If you continue to use smokeless tobacco, you will increase your risk for developing certain cancers. These include:  · Tongue.  · Lips, mouth, and gums.  · Throat (esophagus) and voice box (larynx).  · Stomach.  · Pancreas.  · Bladder.  · Colon.  Long-term use of smokeless tobacco can also lead to:  · High blood pressure, heart disease, and stroke.  · Gum disease, gum recession, and bone loss around the teeth.  · Tooth decay.  How do I quit using smokeless tobacco?  Quitting the use of smokeless tobacco can be hard, but it can be done. Follow these steps:  · Pick a date to quit. Set a date within the next two weeks. This gives you time to prepare.  · Write down the reasons why you are quitting. Keep this list in places where you will see it often, such as on your bathroom mirror or in your car or wallet.  · Identify the people, places, things, and activities that make you want to use tobacco (triggers) and avoid them.  · Get rid of any tobacco you have and remove any tobacco smells. To do this:  ? Throw away all containers of tobacco at home, at work, and in your car.  ? Throw away any other items that you use regularly when you chew tobacco.  ? Clean your car and make sure to remove all tobacco-related items.  ? Clean your home, including curtains and carpets.  · Tell your family, friends, and coworkers that you are quitting. This can make quitting easier.  · Ask your health care provider for help quitting smokeless tobacco. This may involve treatment. Find out what treatment options are covered by your health insurance.  · Keep track of how many days have passed since you quit. Remembering how long and hard you have worked to quit can help you avoid using tobacco again.  Where can I get support?  Ask your health care provider if there is  a local support group for quitting smokeless tobacco.  Where can I get more information?  You can learn more about the risks of using smokeless tobacco and the benefits of quitting from these sources:  · National Cancer [place]: www.cancer.gov  · American Cancer Society: www.cancer.org  When should I seek medical care?  Seek medical care if you have:  · White or other discolored patches in your mouth.  · Difficulty swallowing.  · A change in your voice.  · Unexplained weight loss.  · Stomach pain, nausea, or vomiting.  Summary  · Smokeless tobacco contains at least 28 different chemicals that are known to cause cancer (carcinogen).  · Nicotine is an addictive chemical in smokeless tobacco.  · When you quit using smokeless tobacco, you lower your risk of developing cancer.  This information is not intended to replace advice given to you by your health care provider. Make sure you discuss any questions you have with your health care provider.  Document Released: 05/21/2012 Document Revised: 08/03/2018 Document Reviewed: 07/29/2016  [employer] Interactive Patient Education © 2019 [employer]  BMI for Adults    Body mass index (BMI) is a number that is calculated from a person's weight and height. BMI may help to estimate how much of a person's weight is composed of fat. BMI can help identify those who may be at higher risk for certain medical problems.  How is BMI used with adults?  BMI is used as a screening tool to identify possible weight problems. It is used to check whether a person is obese, overweight, healthy weight, or underweight.  How is BMI calculated?  BMI measures your weight and compares it to your height. This can be done either in English (U[place] or metric measurements. Note that charts are available to help you find your BMI quickly and easily without having to do these calculations yourself.  To calculate your BMI in English ([place] measurements, your health care provider will:  1. Measure your  "weight in pounds (lb).  2. Multiply the number of pounds by 703.  ? For example, for a person who weighs 180 lb, multiply that number by 703, which equals 126,540.  3. Measure your height in inches (in). Then multiply that number by itself to get a measurement called \"inches squared.\"  ? For example, for a person who is 70 in tall, the \"inches squared\" measurement is 70 in x 70 in, which equals 4900 inches squared.  4. Divide the total from Step 2 (number of lb x 703) by the total from Step 3 (inches squared): 126,540 ÷ 4900 = 25.8. This is your BMI.  To calculate your BMI in metric measurements, your health care provider will:  1. Measure your weight in kilograms (kg).  2. Measure your height in meters (m). Then multiply that number by itself to get a measurement called \"meters squared.\"  ? For example, for a person who is 1.75 m tall, the \"meters squared\" measurement is 1.75 m x 1.75 m, which is equal to 3.1 meters squared.  3. Divide the number of kilograms (your weight) by the meters squared number. In this example: 70 ÷ 3.1 = 22.6. This is your BMI.  How is BMI interpreted?  To interpret your results, your health care provider will use BMI charts to identify whether you are underweight, normal weight, overweight, or obese. The following guidelines will be used:  · Underweight: BMI less than 18.5.  · Normal weight: BMI between 18.5 and 24.9.  · Overweight: BMI between 25 and 29.9.  · Obese: BMI of 30 and above.  Please note:  · Weight includes both fat and muscle, so someone with a muscular build, such as an athlete, may have a BMI that is higher than 24.9. In cases like these, BMI is not an accurate measure of body fat.  · To determine if excess body fat is the cause of a BMI of 25 or higher, further assessments may need to be done by a health care provider.  · BMI is usually interpreted in the same way for men and women.  Why is BMI a useful tool?  BMI is useful in two ways:  · Identifying a weight problem " that may be related to a medical condition, or that may increase the risk for medical problems.  · Promoting lifestyle and diet changes in order to reach a healthy weight.  Summary  · Body mass index (BMI) is a number that is calculated from a person's weight and height.  · BMI may help to estimate how much of a person's weight is composed of fat. BMI can help identify those who may be at higher risk for certain medical problems.  · BMI can be measured using English measurements or metric measurements.  · To interpret your results, your health care provider will use BMI charts to identify whether you are underweight, normal weight, overweight, or obese.  This information is not intended to replace advice given to you by your health care provider. Make sure you discuss any questions you have with your health care provider.  Document Released: 08/29/2005 Document Revised: 10/31/2018 Document Reviewed: 10/31/2018  [employer] Interactive Patient Education © 2019 [employer]

## 2019-12-11 NOTE — PROGRESS NOTES
Problem list     Subjective   Marcos Ordaz is a 79 y.o. male     Chief Complaint   Patient presents with   • Shortness of Breath       HPI  The patient presents back today for evaluation and follow-up.  We had seen this gentleman initially in the setting of dyspnea, dizziness, presyncope, and overall fatigue.  He was scheduled for testing.  Echo findings were largely unremarkable with preserved systolic function and no significant valvular issues.  There was questionable lateral wall ischemia by nuclear stress test findings.  We had offered consideration for catheterization.  The patient felt so well that he did not want to pursue any further invasive testing.  We wanted to see him back today to evaluate clinical course.    At this time, the patient tells me that he is doing well clinically.  He still has dyspnea and fatigue which is been a chronic issue for him.  He has no chest pain.  He has no failure dysrhythmic symptoms.  He is hypertensive today but typically normotensive when checked at home.  Labs are followed up with his primary care provider and felt to be normal by patient.  He continues to feel well and would not want any further work-up as he is doing so well.    Current Outpatient Medications on File Prior to Visit   Medication Sig Dispense Refill   • aspirin 81 MG tablet Take 81 mg by mouth Daily.     • celecoxib (CeleBREX) 200 MG capsule Take 200 mg by mouth Daily.     • doxazosin (CARDURA) 4 MG tablet Take 4 mg by mouth Daily.     • finasteride (PROSCAR) 5 MG tablet Take 5 mg by mouth Daily.     • Glucosamine-Chondroit-Vit C-Mn (GLUCOSAMINE 1500 COMPLEX PO) Take 1 tablet by mouth Daily.     • omeprazole (priLOSEC) 40 MG capsule Take 40 mg by mouth Daily.     • vitamin B-12 (CYANOCOBALAMIN) 1000 MCG tablet Take 1 tablet by mouth Daily.       No current facility-administered medications on file prior to visit.        Codeine    Past Medical History:   Diagnosis Date   • Elevated prostate specific antigen  (PSA)        Social History     Socioeconomic History   • Marital status:      Spouse name: Not on file   • Number of children: Not on file   • Years of education: Not on file   • Highest education level: Not on file   Tobacco Use   • Smoking status: Former Smoker     Packs/day: 1.00     Years: 20.00     Pack years: 20.00     Types: Cigarettes   • Smokeless tobacco: Current User     Types: Chew   Substance and Sexual Activity   • Alcohol use: No     Frequency: Never   • Drug use: No   • Sexual activity: Defer       Family History   Problem Relation Age of Onset   • No Known Problems Mother    • No Known Problems Father    • No Known Problems Sister    • No Known Problems Brother        Review of Systems   Constitutional: Positive for fatigue (easily fatigued).   HENT: Negative.  Negative for congestion, rhinorrhea and sneezing.    Eyes: Positive for visual disturbance (wears glasses PRN).   Respiratory: Positive for shortness of breath (SOA on exertion ). Negative for cough, chest tightness and wheezing.    Cardiovascular: Negative.  Negative for chest pain, palpitations and leg swelling.   Gastrointestinal: Negative.  Negative for abdominal pain, nausea and vomiting.   Endocrine: Negative.  Negative for cold intolerance and heat intolerance.   Genitourinary: Negative.  Negative for difficulty urinating, frequency and urgency.   Musculoskeletal: Positive for arthralgias (joints). Negative for back pain, neck pain and neck stiffness.   Skin: Negative.  Negative for rash and wound.   Allergic/Immunologic: Negative.  Negative for environmental allergies and food allergies.   Neurological: Positive for dizziness (dizziness with vertigo flare ups) and weakness (generalized weakness). Negative for syncope and light-headedness.   Hematological: Bruises/bleeds easily (bruises and bleeds easily).   Psychiatric/Behavioral: Negative.  Negative for agitation, confusion and sleep disturbance (denies waking up  "smothering/SOA). The patient is not nervous/anxious.        Objective   Vitals:    12/11/19 1301   BP: 171/83   BP Location: Left arm   Patient Position: Sitting   Pulse: 66   SpO2: 96%   Weight: 94.8 kg (209 lb)   Height: 190.5 cm (75\")      /83 (BP Location: Left arm, Patient Position: Sitting)   Pulse 66   Ht 190.5 cm (75\")   Wt 94.8 kg (209 lb)   SpO2 96%   BMI 26.12 kg/m²    Lab Results (most recent)     None        Physical Exam   Constitutional: He is oriented to person, place, and time. He appears well-developed and well-nourished. No distress.   HENT:   Head: Normocephalic and atraumatic.   Eyes: Pupils are equal, round, and reactive to light. Conjunctivae and EOM are normal.   Neck: Normal range of motion. Neck supple. No JVD present. No tracheal deviation present.   Cardiovascular: Normal rate, regular rhythm, normal heart sounds and intact distal pulses.   Pulmonary/Chest: Effort normal and breath sounds normal.   Abdominal: Soft. Bowel sounds are normal. He exhibits no distension and no mass. There is no tenderness. There is no rebound and no guarding.   Musculoskeletal: Normal range of motion. He exhibits no edema, tenderness or deformity.   Neurological: He is alert and oriented to person, place, and time.   Skin: Skin is warm and dry. No rash noted. No erythema. No pallor.   Psychiatric: He has a normal mood and affect. His behavior is normal. Judgment and thought content normal.   Nursing note and vitals reviewed.        Procedure   Procedures       Assessment/Plan      Diagnosis Plan   1. SOB (shortness of breath)     2. Dizziness     3. Pre-syncope     4. Essential hypertension       1.  At this time, the patient appears to be doing fairly well from cardiovascular standpoint.  Previous stress test questions lateral wall ischemia.  We have discussed further evaluation with him, in particular invasive evaluation for definitive coronary anatomy evaluation.  He feels so well that he wants " no further work-up.  If he has change in clinical course, he will call to us.    2.  Symptomatically, he appears to be doing well.  Again, dyspnea stable.  He has had no further dizziness or presyncopal episodes of any significance.  He denies angina or anginal equivalent symptoms otherwise.  We reviewed with him for any symptoms that he should present back to the clinic immediately.    3.  He is hypertensive today but typically normotensive when checked at home.  He will monitor blood pressures closely at home and call to us for any issues.    4.  The patient will call to us for any complications otherwise.  I will see him back in 3 to 6-month intervals.  We eventually will consider updating his noninvasive studies to ensure no progression in his ischemic defect or increasing risk issues otherwise.           Marcos Ordaz is a current smokeless tobacco user.  He currently chews 1 chaws or dips of smokeless tobacco per day for a duration of 20 years. I have educated him on the risk of diseases from using tobacco products such as cancer, COPD and heart diease.      Patient's Body mass index is 26.12 kg/m². BMI is above normal parameters. Recommendations include: educational material and referral to primary care.             Electronically signed by:

## 2020-06-11 ENCOUNTER — OFFICE VISIT (OUTPATIENT)
Dept: CARDIOLOGY | Facility: CLINIC | Age: 80
End: 2020-06-11

## 2020-06-11 VITALS
OXYGEN SATURATION: 98 % | HEART RATE: 85 BPM | BODY MASS INDEX: 26.86 KG/M2 | HEIGHT: 75 IN | DIASTOLIC BLOOD PRESSURE: 69 MMHG | WEIGHT: 216 LBS | TEMPERATURE: 97.7 F | SYSTOLIC BLOOD PRESSURE: 112 MMHG

## 2020-06-11 DIAGNOSIS — R06.02 SOB (SHORTNESS OF BREATH): Primary | ICD-10-CM

## 2020-06-11 DIAGNOSIS — I10 ESSENTIAL HYPERTENSION: ICD-10-CM

## 2020-06-11 DIAGNOSIS — R42 DIZZINESS: ICD-10-CM

## 2020-06-11 PROCEDURE — 99213 OFFICE O/P EST LOW 20 MIN: CPT | Performed by: PHYSICIAN ASSISTANT

## 2020-06-11 NOTE — PROGRESS NOTES
Problem list     Subjective   Marcos Ordaz is a 80 y.o. male     Chief Complaint   Patient presents with   • Palpitations     Here for a 6 month follow up    • Shortness of Breath       HPI  The patient presents back today for routine follow-up.  We had seen this pleasant gentleman initially in the setting of dyspnea, dizziness, presyncope, and fatigue.  He was scheduled for testing.  Echo suggested preserved systolic function and was largely unremarkable otherwise.  The patient's nuclear stress test questioned posterior lateral wall ischemia.  We have discussed consideration for invasive evaluation.  The patient feels too well and does not want a pursue that.    At this time, the patient reports stability.  He still has dyspnea and dizziness at times, which seem to be baseline symptoms.  He denies chest pain.  He has had no syncope.  He denies failure symptoms.  Blood pressures are low today which he relates secondary to doxazosin.  He is decreased that on his own at home and feels improved.  He has no further complaints otherwise.    Current Outpatient Medications on File Prior to Visit   Medication Sig Dispense Refill   • aspirin 81 MG tablet Take 81 mg by mouth Daily.     • celecoxib (CeleBREX) 200 MG capsule Take 200 mg by mouth Daily.     • doxazosin (CARDURA) 4 MG tablet Take 4 mg by mouth Daily.     • finasteride (PROSCAR) 5 MG tablet Take 5 mg by mouth Daily.     • Glucosamine-Chondroit-Vit C-Mn (GLUCOSAMINE 1500 COMPLEX PO) Take 1 tablet by mouth Daily.     • omeprazole (priLOSEC) 40 MG capsule Take 40 mg by mouth Daily.     • vitamin B-12 (CYANOCOBALAMIN) 1000 MCG tablet Take 1 tablet by mouth Daily.       No current facility-administered medications on file prior to visit.        Codeine     Past Medical History:   Diagnosis Date   • Elevated prostate specific antigen (PSA)        Social History     Socioeconomic History   • Marital status:      Spouse name: Not on file   • Number of children: Not on  file   • Years of education: Not on file   • Highest education level: Not on file   Tobacco Use   • Smoking status: Former Smoker     Packs/day: 1.00     Years: 20.00     Pack years: 20.00     Types: Cigarettes   • Smokeless tobacco: Current User     Types: Chew   Substance and Sexual Activity   • Alcohol use: No     Frequency: Never   • Drug use: No   • Sexual activity: Defer       Family History   Problem Relation Age of Onset   • No Known Problems Mother    • No Known Problems Father    • No Known Problems Sister    • No Known Problems Brother        Review of Systems   Constitutional: Positive for fatigue (tires easily ). Negative for chills and fever.   HENT: Negative.  Negative for congestion, rhinorrhea and sore throat.    Eyes: Positive for visual disturbance (reading glasses ).   Respiratory: Positive for shortness of breath. Negative for chest tightness.    Cardiovascular: Positive for chest pain (some pain in right side of chest at times ) and leg swelling (wears compression socks ). Negative for palpitations.   Gastrointestinal: Negative.  Negative for abdominal pain, blood in stool, nausea and vomiting.   Endocrine: Negative.  Negative for cold intolerance and heat intolerance.   Genitourinary: Negative.  Negative for dysuria, frequency, hematuria and urgency.   Musculoskeletal: Positive for arthralgias (joints ). Negative for back pain.   Skin: Negative.  Negative for rash and wound.   Allergic/Immunologic: Negative.  Negative for environmental allergies and food allergies.   Neurological: Positive for light-headedness (occasionally when up moving around). Negative for dizziness and weakness.   Hematological: Bruises/bleeds easily.   Psychiatric/Behavioral: Positive for agitation. Negative for confusion and sleep disturbance (denies waking with smothering ). The patient is not nervous/anxious.        Objective   Vitals:    06/11/20 1328   BP: 112/69   BP Location: Left arm   Patient Position: Sitting  "  Pulse: 85   Temp: 97.7 °F (36.5 °C)   SpO2: 98%   Weight: 98 kg (216 lb)   Height: 190.5 cm (75\")      /69 (BP Location: Left arm, Patient Position: Sitting)   Pulse 85   Temp 97.7 °F (36.5 °C)   Ht 190.5 cm (75\")   Wt 98 kg (216 lb)   SpO2 98%   BMI 27.00 kg/m²    Lab Results (most recent)     None        Physical Exam   Constitutional: He is oriented to person, place, and time. He appears well-developed and well-nourished. No distress.   HENT:   Head: Normocephalic and atraumatic.   Eyes: Pupils are equal, round, and reactive to light. Conjunctivae and EOM are normal.   Neck: Normal range of motion. Neck supple. No JVD present. No tracheal deviation present.   Cardiovascular: Normal rate, regular rhythm, normal heart sounds and intact distal pulses.   Pulmonary/Chest: Effort normal and breath sounds normal.   Abdominal: Soft. Bowel sounds are normal. He exhibits no distension and no mass. There is no tenderness. There is no rebound and no guarding.   Musculoskeletal: Normal range of motion. He exhibits no edema, tenderness or deformity.   Neurological: He is alert and oriented to person, place, and time.   Skin: Skin is warm and dry. No rash noted. No erythema. No pallor.   Psychiatric: He has a normal mood and affect. His behavior is normal. Judgment and thought content normal.   Nursing note and vitals reviewed.        Procedure   Procedures       Assessment/Plan      Diagnosis Plan   1. SOB (shortness of breath)     2. Dizziness     3. Essential hypertension       1.  At this time, the patient appears stable from cardiovascular standpoint.  Symptoms of dyspnea and dizziness are stable and at baseline by his report.    2.  He does feel that some of his symptoms of dizziness and presyncope was related to doxazosin.  He has decreased that and overall feels improved.  He will continue to monitor blood pressures at home and call to us for any hypotensive issues otherwise.    3.  Previous stress test " question posterior lateral wall ischemia.  He feels well at this time and does not want to pursue cath.  For decompensation and clinical course or worsening symptoms otherwise, he will call to us and we will have to consider a more diagnostic approach to his abnormal stress test and symptoms otherwise.  For now, we will just continue to follow him clinically.    4.  We will see him on 6-month intervals.  He will call for any issues.  Nothing further for now as I feel he is stable.           Marcos Ordaz  reports that he has quit smoking. His smoking use included cigarettes. He has a 20.00 pack-year smoking history. His smokeless tobacco use includes chew.. I have educated him on the risk of diseases from using tobacco products such as cancer, COPD and heart diease.     I advised him to quit and he is not willing to quit.       Patient's Body mass index is 27 kg/m². BMI is above normal parameters. Recommendations include: educational material and referral to primary care.         Advance Care Planning   ACP discussion was declined by the patient. Patient has an advance directive (not in EMR), copy requested.    Electronically signed by:

## 2020-06-11 NOTE — PATIENT INSTRUCTIONS
Smokeless Tobacco Information, Adult    Tobacco is a leafy plant that contains a chemical (nicotine). Nicotine affects your brain and causes you to become addicted to it. Smokeless tobacco is tobacco that you put in your mouth instead of smoking it. It may also be called chewing tobacco or snuff.  Smokeless tobacco is made from the leaves of tobacco plants and comes in many forms, such as:  · Loose, dry leaves.  · Plugs or twists.  · Moist pouches.  · Dissolving lozenges or strips.  Chewing, sucking, or holding the tobacco in your mouth causes your mouth to make more saliva. The saliva mixes with the tobacco, which you swallow or spit out. Using tobacco is harmful to your health.  How can smokeless tobacco affect me?  All forms of tobacco contain many chemicals that can harm every organ in the body. Using smokeless tobacco increases your risk for:  · Cancer. Tobacco use is one of the leading causes of cancer. Smokeless tobacco is linked to cancer of the mouth, esophagus, and pancreas.  · Other long-term health problems, including high blood pressure, heart disease, and stroke.  · Addiction.  · Pregnancy complications, if this applies. Pregnant women who use smokeless tobacco are more likely to have a miscarriage or deliver a baby too early.  · Mouth and dental problems, such as:  ? Bad breath.  ? Teeth that look yellow or brown.  ? Mouth sores.  ? Cracking and bleeding lips.  ? Gum recession, gum disease, or tooth decay.  ? Lesions on the soft tissues of your mouth (leukoplakia).  The benefits of not using smokeless tobacco include:  · A healthy mind and body.  · Saving money. You avoid the cost of buying tobacco and the cost of treating illnesses that are caused by tobacco.  What actions can I take to quit using tobacco?  Ask your health care provider for help to quit using smokeless tobacco. This may involve treatment.  These tips may also help you quit:  · Pick a date to quit. Set a date within the next two  weeks. This gives you time to prepare.  · Write down the reasons why you are quitting. Keep this list in places where you will see it often, such as on your bathroom mirror or in your car or wallet.  · Identify the people, places, things, and activities that make you want to use smokeless tobacco (triggers). Avoid them.  · Get rid of any tobacco you have and remove any tobacco smells. To do this:  ? Throw away all containers of tobacco at home, at work, and in your car.  ? Throw away any other items that you use regularly when you chew tobacco.  ? Clean your car and make sure to remove all tobacco-related items.  ? Clean your home, including curtains and carpets.  · Tell your family and friends that you are quitting. Having support can make quitting easier.  · Chew sugarless gum or sunflower seeds when you want to use smokeless tobacco.  · Stay positive. Be prepared for cravings. It is common to slip up when you first quit, so take it one day at a time.  · Stay busy and take care of your body. Get plenty of exercise. Drink enough water to keep your urine pale yellow.  · Keep track of how many days have passed since you quit. Remembering how long and hard you have worked to quit can help you avoid using smokeless tobacco again.  Where to find support  Ask your health care provider if there is a local support group for quitting smokeless tobacco.  Where to find more information  You can learn more about the risks of using smokeless tobacco and the benefits of quitting from these sources:  · American Cancer Society: www.cancer.org  · National [employer] Senath: www.cancer.gov  · Centers for Disease Control and Prevention: www.cdc.gov  Contact a health care provider if you have:  · Trouble quitting smokeless tobacco use on your own.  · White or other discolored patches in your mouth.  · Difficulty swallowing.  · A change in your voice.  · Unexplained weight loss.  · Stomach pain, nausea, or  vomiting.  Summary  · Smokeless tobacco contains many different chemicals that are known to cause cancer.  · Nicotine is an addictive chemical in smokeless tobacco that affects your brain.  · The benefits of not using smokeless tobacco include having a healthy mind and body and saving money.  · Tell your family and friends that you are quitting. Having support can make quitting easier.  · Ask your health care provider for help quitting smokeless tobacco. This may involve treatment.  This information is not intended to replace advice given to you by your health care provider. Make sure you discuss any questions you have with your health care provider.  Document Released: 05/21/2012 Document Revised: 02/24/2020 Document Reviewed: 02/24/2020  Elsevier Patient Education © 2020 [employer]

## 2021-04-27 ENCOUNTER — OFFICE VISIT (OUTPATIENT)
Dept: CARDIOLOGY | Facility: CLINIC | Age: 81
End: 2021-04-27

## 2021-04-27 VITALS
HEIGHT: 75 IN | WEIGHT: 211.4 LBS | SYSTOLIC BLOOD PRESSURE: 102 MMHG | OXYGEN SATURATION: 97 % | HEART RATE: 73 BPM | DIASTOLIC BLOOD PRESSURE: 73 MMHG | BODY MASS INDEX: 26.28 KG/M2

## 2021-04-27 DIAGNOSIS — R06.02 SOB (SHORTNESS OF BREATH): Primary | ICD-10-CM

## 2021-04-27 DIAGNOSIS — I10 ESSENTIAL HYPERTENSION: ICD-10-CM

## 2021-04-27 DIAGNOSIS — R42 DIZZINESS: ICD-10-CM

## 2021-04-27 PROCEDURE — 99213 OFFICE O/P EST LOW 20 MIN: CPT | Performed by: PHYSICIAN ASSISTANT

## 2021-04-27 PROCEDURE — 93000 ELECTROCARDIOGRAM COMPLETE: CPT | Performed by: PHYSICIAN ASSISTANT

## 2021-04-27 RX ORDER — DOXAZOSIN 2 MG/1
TABLET ORAL
Qty: 90 TABLET | Refills: 3 | Status: SHIPPED | OUTPATIENT
Start: 2021-04-27

## 2021-04-27 NOTE — PATIENT INSTRUCTIONS

## 2021-04-27 NOTE — PROGRESS NOTES
Problem list     Subjective   Marcos Ordaz is a 80 y.o. male     Chief Complaint   Patient presents with   • Follow-up       HPI  The patient presents back into the clinic today for routine evaluation and follow-up.  We had seen this patient initially because of dizziness consistent with orthostasis by description, dyspnea, episodes of presyncope, and marked fatigue.  He was scheduled for cardiac testing at that time.  His echo indicated preserved systolic function with no significant valvular issues.  His nuclear stress test questioned posterior lateral wall ischemia.  We recommended consideration for catheterization.  The patient is felt to well to this time to proceed with the same.    Clinically, the patient has ongoing dyspnea and chest tightness with relatively low levels of activity.  He reports no failure or dysrhythmic symptoms.  He remains dizzy, which improved some after doxazosin was decreased previously.  The patient has no further complaints otherwise at this time.    Current Outpatient Medications on File Prior to Visit   Medication Sig Dispense Refill   • aspirin 81 MG tablet Take 81 mg by mouth Daily.     • celecoxib (CeleBREX) 200 MG capsule Take 200 mg by mouth Daily.     • finasteride (PROSCAR) 5 MG tablet Take 5 mg by mouth Daily.     • Glucosamine-Chondroit-Vit C-Mn (GLUCOSAMINE 1500 COMPLEX PO) Take 1 tablet by mouth Daily.     • omeprazole (priLOSEC) 40 MG capsule Take 40 mg by mouth Daily.     • [DISCONTINUED] doxazosin (CARDURA) 4 MG tablet Take 4 mg by mouth Daily.     • vitamin B-12 (CYANOCOBALAMIN) 1000 MCG tablet Take 1 tablet by mouth Daily.       No current facility-administered medications on file prior to visit.       Codeine    Past Medical History:   Diagnosis Date   • COVID-19 vaccine administered    • Elevated prostate specific antigen (PSA)        Social History     Socioeconomic History   • Marital status:      Spouse name: Not on file   • Number of children: Not on file   •  "Years of education: Not on file   • Highest education level: Not on file   Tobacco Use   • Smoking status: Former Smoker     Packs/day: 1.00     Years: 20.00     Pack years: 20.00     Types: Cigarettes   • Smokeless tobacco: Current User     Types: Chew   Substance and Sexual Activity   • Alcohol use: No   • Drug use: No   • Sexual activity: Defer       Family History   Problem Relation Age of Onset   • No Known Problems Mother    • No Known Problems Father    • No Known Problems Sister    • No Known Problems Brother        Review of Systems   Constitutional: Positive for fatigue. Negative for chills and fever.   HENT: Negative for congestion, rhinorrhea and sore throat.    Eyes: Negative.  Negative for visual disturbance (glasses).   Respiratory: Positive for chest tightness and shortness of breath. Negative for wheezing.    Cardiovascular: Positive for chest pain. Negative for palpitations.   Gastrointestinal: Negative.    Endocrine: Negative.  Negative for cold intolerance.   Genitourinary: Negative.    Musculoskeletal: Positive for arthralgias. Negative for back pain and neck pain.   Skin: Negative for rash and wound.   Allergic/Immunologic: Negative.  Negative for environmental allergies.   Neurological: Positive for dizziness, weakness (legs) and numbness (feet ). Negative for headaches.   Hematological: Negative.  Does not bruise/bleed easily.   Psychiatric/Behavioral: Negative.  Negative for sleep disturbance.       Objective   Vitals:    04/27/21 1302   BP: 102/73   BP Location: Right arm   Patient Position: Sitting   Pulse: 73   SpO2: 97%   Weight: 95.9 kg (211 lb 6.4 oz)   Height: 190.5 cm (75\")      /73 (BP Location: Right arm, Patient Position: Sitting)   Pulse 73   Ht 190.5 cm (75\")   Wt 95.9 kg (211 lb 6.4 oz)   SpO2 97%   BMI 26.42 kg/m²    Lab Results (most recent)     None        Physical Exam  Vitals and nursing note reviewed.   Constitutional:       General: He is not in acute " distress.     Appearance: He is well-developed.   HENT:      Head: Normocephalic and atraumatic.   Eyes:      Conjunctiva/sclera: Conjunctivae normal.      Pupils: Pupils are equal, round, and reactive to light.   Neck:      Vascular: No JVD.      Trachea: No tracheal deviation.   Cardiovascular:      Rate and Rhythm: Normal rate and regular rhythm.      Heart sounds: Normal heart sounds.   Pulmonary:      Effort: Pulmonary effort is normal.      Breath sounds: Normal breath sounds.   Abdominal:      General: Bowel sounds are normal. There is no distension.      Palpations: Abdomen is soft. There is no mass.      Tenderness: There is no abdominal tenderness. There is no guarding or rebound.   Musculoskeletal:         General: No tenderness or deformity. Normal range of motion.      Cervical back: Normal range of motion and neck supple.   Skin:     General: Skin is warm and dry.      Coloration: Skin is not pale.      Findings: No erythema or rash.   Neurological:      Mental Status: He is alert and oriented to person, place, and time.   Psychiatric:         Behavior: Behavior normal.         Thought Content: Thought content normal.         Judgment: Judgment normal.           Procedure     ECG 12 Lead    Date/Time: 4/27/2021 1:07 PM  Performed by: Raheem Tellez PA  Authorized by: Raheem Tellez PA   Comparison: compared with previous ECG from 8/7/2019  Comparison to previous ECG: Sinus rhythm, rate 75, first-degree AV block, no acute changes noted.                 Assessment/Plan      Diagnosis Plan   1. SOB (shortness of breath)     2. Dizziness     3. Essential hypertension       1.  At this time, the patient reports overall stability.  His dyspnea and even mild chest tightness with activity has progressed slightly.  I again discussed his previous stress test which question posterior lateral wall ischemia.  I again discussed consideration for catheterization with him today.  He again confirms that he just  feels too well for now to consider further evaluation and work-up.  For change in clinical course, he will call immediately.  We will revisit further evaluation with him at his work-up.    2.  Although previous orthostatic blood pressure checks did not indicate significant orthostatic changes, by description, the patient has evidence of the same.  I would decrease doxazosin again from 2 to 1 mg daily.  He will monitor blood pressure and clinical course and call to us for any issues.    3.  I would continue his medical regimen otherwise.  We will continue to see him on 4 to 6-month follow-ups.  He will call for complications.           Marcos Orlin  reports that he has quit smoking. His smoking use included cigarettes. He has a 20.00 pack-year smoking history. His smokeless tobacco use includes chew..          Patient's Body mass index is 26.42 kg/m². BMI is above normal parameters. Recommendations include: educational material.             Electronically signed by:

## 2021-10-27 ENCOUNTER — OFFICE VISIT (OUTPATIENT)
Dept: CARDIOLOGY | Facility: CLINIC | Age: 81
End: 2021-10-27

## 2021-10-27 VITALS
OXYGEN SATURATION: 97 % | DIASTOLIC BLOOD PRESSURE: 71 MMHG | WEIGHT: 213.6 LBS | HEART RATE: 76 BPM | BODY MASS INDEX: 26.56 KG/M2 | HEIGHT: 75 IN | SYSTOLIC BLOOD PRESSURE: 133 MMHG

## 2021-10-27 DIAGNOSIS — I10 ESSENTIAL HYPERTENSION: ICD-10-CM

## 2021-10-27 DIAGNOSIS — R06.02 SOB (SHORTNESS OF BREATH): Primary | ICD-10-CM

## 2021-10-27 DIAGNOSIS — R42 DIZZINESS: ICD-10-CM

## 2021-10-27 PROCEDURE — 99213 OFFICE O/P EST LOW 20 MIN: CPT | Performed by: PHYSICIAN ASSISTANT

## 2021-10-27 RX ORDER — MELOXICAM 7.5 MG/1
7.5 TABLET ORAL DAILY
COMMUNITY

## 2021-10-27 NOTE — PATIENT INSTRUCTIONS

## 2022-04-28 ENCOUNTER — OFFICE VISIT (OUTPATIENT)
Dept: CARDIOLOGY | Facility: CLINIC | Age: 82
End: 2022-04-28

## 2022-04-28 VITALS
BODY MASS INDEX: 25.69 KG/M2 | OXYGEN SATURATION: 94 % | DIASTOLIC BLOOD PRESSURE: 74 MMHG | SYSTOLIC BLOOD PRESSURE: 126 MMHG | WEIGHT: 206.6 LBS | HEIGHT: 75 IN | HEART RATE: 72 BPM

## 2022-04-28 DIAGNOSIS — R07.2 PRECORDIAL PAIN: Primary | ICD-10-CM

## 2022-04-28 DIAGNOSIS — R06.02 SOB (SHORTNESS OF BREATH): ICD-10-CM

## 2022-04-28 DIAGNOSIS — R55 PRE-SYNCOPE: ICD-10-CM

## 2022-04-28 PROCEDURE — 93000 ELECTROCARDIOGRAM COMPLETE: CPT | Performed by: PHYSICIAN ASSISTANT

## 2022-04-28 PROCEDURE — 99214 OFFICE O/P EST MOD 30 MIN: CPT | Performed by: PHYSICIAN ASSISTANT

## 2022-04-28 NOTE — PROGRESS NOTES
Problem list     Subjective   Marcos Ordaz is a 81 y.o. male     Chief Complaint   Patient presents with   • Follow-up     6 MONTH    • Shortness of Breath   • Loss of Consciousness     Pre - with exertion       HPI  The patient presents to the clinic today for follow-up.  We have seen this gentleman historically primarily in the setting of orthostasis and some symptoms otherwise.  We have adjusted antihypertensive therapy previously and have now minimized his orthostatic complaints.  He was scheduled for stress test a number of years ago and this suggested posterolateral wall ischemia.  Catheterization was never pursued as the patient was doing well.  He still has mild chest tightness at times.  He describes precordial tightness.  Occasionally this can be with exertion.  He also will have a degree of dizziness and even presyncope if he overexerts.  Baseline dyspnea has been persistent and is moderately bothersome for the patient.  He denies failure symptoms.  He has no further complaints at this time.    Current Outpatient Medications on File Prior to Visit   Medication Sig Dispense Refill   • aspirin 81 MG tablet Take 81 mg by mouth Daily.     • celecoxib (CeleBREX) 200 MG capsule Take 200 mg by mouth Daily.     • doxazosin (CARDURA) 2 MG tablet Take 1/2 tablet daily 90 tablet 3   • finasteride (PROSCAR) 5 MG tablet Take 5 mg by mouth Daily.     • Glucosamine-Chondroit-Vit C-Mn (GLUCOSAMINE 1500 COMPLEX PO) Take 1 tablet by mouth Daily.     • meloxicam (MOBIC) 7.5 MG tablet Take 7.5 mg by mouth Daily.     • omeprazole (priLOSEC) 40 MG capsule Take 40 mg by mouth Daily.     • vitamin B-12 (CYANOCOBALAMIN) 1000 MCG tablet Take 1 tablet by mouth Daily.       No current facility-administered medications on file prior to visit.       Codeine    Past Medical History:   Diagnosis Date   • COVID-19 vaccine administered    • Elevated prostate specific antigen (PSA)        Social History     Socioeconomic History   • Marital  "status:    Tobacco Use   • Smoking status: Former Smoker     Packs/day: 1.00     Years: 20.00     Pack years: 20.00     Types: Cigarettes   • Smokeless tobacco: Current User     Types: Chew   Substance and Sexual Activity   • Alcohol use: No   • Drug use: No   • Sexual activity: Defer       Family History   Problem Relation Age of Onset   • No Known Problems Mother    • No Known Problems Father    • No Known Problems Sister    • No Known Problems Brother        Review of Systems   Constitutional: Positive for fatigue. Negative for chills and fever.   HENT: Negative for congestion, rhinorrhea and sore throat.    Eyes: Positive for visual disturbance (glasses).   Respiratory: Positive for shortness of breath. Negative for chest tightness and wheezing.    Cardiovascular: Positive for chest pain. Negative for palpitations and leg swelling.   Gastrointestinal: Negative.    Endocrine: Negative.    Genitourinary: Negative.    Musculoskeletal: Negative.  Negative for arthralgias, back pain and neck pain.   Skin: Negative.  Negative for rash and wound.   Allergic/Immunologic: Negative for environmental allergies.   Neurological: Positive for dizziness and numbness. Negative for weakness and headaches.   Hematological: Bruises/bleeds easily (bruises / bleeds).   Psychiatric/Behavioral: Negative.  Negative for sleep disturbance.       Objective   Vitals:    04/28/22 1251   BP: 126/74   BP Location: Left arm   Patient Position: Sitting   Pulse: 72   SpO2: 94%   Weight: 93.7 kg (206 lb 9.6 oz)   Height: 190.5 cm (75\")      /74 (BP Location: Left arm, Patient Position: Sitting)   Pulse 72   Ht 190.5 cm (75\")   Wt 93.7 kg (206 lb 9.6 oz)   SpO2 94%   BMI 25.82 kg/m²    Lab Results (most recent)     None        Physical Exam  Vitals and nursing note reviewed.   Constitutional:       General: He is not in acute distress.     Appearance: He is well-developed.   HENT:      Head: Normocephalic and atraumatic.   Eyes: "      Conjunctiva/sclera: Conjunctivae normal.      Pupils: Pupils are equal, round, and reactive to light.   Neck:      Vascular: No JVD.      Trachea: No tracheal deviation.   Cardiovascular:      Rate and Rhythm: Normal rate and regular rhythm.      Heart sounds: Normal heart sounds.   Pulmonary:      Effort: Pulmonary effort is normal.      Breath sounds: Normal breath sounds.   Abdominal:      General: Bowel sounds are normal. There is no distension.      Palpations: Abdomen is soft. There is no mass.      Tenderness: There is no abdominal tenderness. There is no guarding or rebound.   Musculoskeletal:         General: No tenderness or deformity. Normal range of motion.      Cervical back: Normal range of motion and neck supple.   Skin:     General: Skin is warm and dry.      Coloration: Skin is not pale.      Findings: No erythema or rash.   Neurological:      Mental Status: He is alert and oriented to person, place, and time.   Psychiatric:         Behavior: Behavior normal.         Thought Content: Thought content normal.         Judgment: Judgment normal.           Procedure     ECG 12 Lead    Date/Time: 4/28/2022 12:53 PM  Performed by: Raheem Tellez PA  Authorized by: Raheem Tellez PA   Comparison: compared with previous ECG from 4/27/2021  Comparison to previous ECG: Sinus rhythm, rate 73, normal axis, no acute changes noted.  Patient has first-degree AV block and intermittent PACs noted.                 Assessment/Plan      Diagnosis Plan   1. Precordial pain  Stress Test With Myocardial Perfusion One Day    Adult Transthoracic Echo Complete W/ Cont if Necessary Per Protocol   2. Pre-syncope  Stress Test With Myocardial Perfusion One Day    Adult Transthoracic Echo Complete W/ Cont if Necessary Per Protocol   3. SOB (shortness of breath)  Stress Test With Myocardial Perfusion One Day    Adult Transthoracic Echo Complete W/ Cont if Necessary Per Protocol       1.  The patient presents in for  routine follow-up.  Previous stress test almost 3 years ago supported posterior lateral wall ischemia.  We never pursued catheterization as the patient was doing well.  He does have a degree of chest discomfort and even exertional presyncope at times.  I feel we need to evaluate and ensure that this is not anginal equivalent symptoms.  He will be scheduled for nuclear stress testing in that setting.  We can compare to prior studies to ensure no progression of ischemic burden or risk otherwise.    2.  With symptoms otherwise, I would schedule for an echo.  We can evaluate LV size and function, valvular morphologies, and cardiac structure otherwise.    3.  I would continue medical regimen without change.    4.  We will see him back to review study results and recommend him further at that time.  He will call for any issues prior to follow-up.         Advance Care Planning   ACP discussion was held with the patient during this visit. Patient has an advance directive (not in EMR), copy requested.         Electronically signed by:

## 2022-06-13 ENCOUNTER — HOSPITAL ENCOUNTER (OUTPATIENT)
Dept: CARDIOLOGY | Facility: HOSPITAL | Age: 82
Discharge: HOME | End: 2022-06-13

## 2022-06-13 DIAGNOSIS — R55 PRE-SYNCOPE: ICD-10-CM

## 2022-06-13 DIAGNOSIS — R07.2 PRECORDIAL PAIN: ICD-10-CM

## 2022-06-13 DIAGNOSIS — R06.02 SOB (SHORTNESS OF BREATH): ICD-10-CM

## 2022-06-13 PROCEDURE — 78452 HT MUSCLE IMAGE SPECT MULT: CPT

## 2022-06-13 PROCEDURE — 25010000002 REGADENOSON 0.4 MG/5ML SOLUTION: Performed by: INTERNAL MEDICINE

## 2022-06-13 PROCEDURE — A9500 TC99M SESTAMIBI: HCPCS | Performed by: INTERNAL MEDICINE

## 2022-06-13 PROCEDURE — 93306 TTE W/DOPPLER COMPLETE: CPT | Performed by: INTERNAL MEDICINE

## 2022-06-13 PROCEDURE — 0 TECHNETIUM SESTAMIBI: Performed by: INTERNAL MEDICINE

## 2022-06-13 PROCEDURE — 93306 TTE W/DOPPLER COMPLETE: CPT

## 2022-06-13 PROCEDURE — 78452 HT MUSCLE IMAGE SPECT MULT: CPT | Performed by: INTERNAL MEDICINE

## 2022-06-13 PROCEDURE — 93017 CV STRESS TEST TRACING ONLY: CPT

## 2022-06-13 PROCEDURE — 93018 CV STRESS TEST I&R ONLY: CPT | Performed by: INTERNAL MEDICINE

## 2022-06-13 RX ADMIN — TECHNETIUM TC 99M SESTAMIBI 1 DOSE: 1 INJECTION INTRAVENOUS at 11:19

## 2022-06-13 RX ADMIN — TECHNETIUM TC 99M SESTAMIBI 1 DOSE: 1 INJECTION INTRAVENOUS at 08:03

## 2022-06-13 RX ADMIN — REGADENOSON 0.4 MG: 0.08 INJECTION, SOLUTION INTRAVENOUS at 11:19

## 2022-06-14 LAB
BH CV REST NUCLEAR ISOTOPE DOSE: 10 MCI
BH CV STRESS COMMENTS STAGE 1: NORMAL
BH CV STRESS DOSE REGADENOSON STAGE 1: 0.4
BH CV STRESS DURATION MIN STAGE 1: 0
BH CV STRESS DURATION SEC STAGE 1: 10
BH CV STRESS NUCLEAR ISOTOPE DOSE: 30 MCI
BH CV STRESS PROTOCOL 1: NORMAL
BH CV STRESS RECOVERY BP: NORMAL MMHG
BH CV STRESS RECOVERY HR: 85 BPM
BH CV STRESS STAGE 1: 1
MAXIMAL PREDICTED HEART RATE: 137 BPM
PERCENT MAX PREDICTED HR: 59.85 %
STRESS BASELINE BP: NORMAL MMHG
STRESS BASELINE HR: 78 BPM
STRESS PERCENT HR: 70 %
STRESS POST PEAK BP: NORMAL MMHG
STRESS POST PEAK HR: 82 BPM
STRESS TARGET HR: 116 BPM

## 2022-06-16 ENCOUNTER — TELEPHONE (OUTPATIENT)
Dept: CARDIOLOGY | Facility: CLINIC | Age: 82
End: 2022-06-16

## 2022-06-16 NOTE — TELEPHONE ENCOUNTER
STRESS  Pt notified of no acute findings. Provider will discuss results at f/u. Pt reminded of appt date and time.  ----- Message from PRISCILA De La Cruz sent at 6/15/2022  1:16 PM EDT -----  Please advise patient.

## 2022-06-26 LAB
BH CV ECHO MEAS - ACS: 2.39 CM
BH CV ECHO MEAS - AO MAX PG: 7.1 MMHG
BH CV ECHO MEAS - AO MEAN PG: 4.1 MMHG
BH CV ECHO MEAS - AO ROOT DIAM: 3.4 CM
BH CV ECHO MEAS - AO V2 MAX: 133 CM/SEC
BH CV ECHO MEAS - AO V2 VTI: 30.6 CM
BH CV ECHO MEAS - EDV(CUBED): 52.7 ML
BH CV ECHO MEAS - EDV(MOD-SP4): 62.7 ML
BH CV ECHO MEAS - EF(MOD-SP4): 60.1 %
BH CV ECHO MEAS - EF_3D-VOL: 58 %
BH CV ECHO MEAS - ESV(CUBED): 8.9 ML
BH CV ECHO MEAS - ESV(MOD-SP4): 25 ML
BH CV ECHO MEAS - FS: 44.8 %
BH CV ECHO MEAS - IVS/LVPW: 1.17 CM
BH CV ECHO MEAS - IVSD: 1.43 CM
BH CV ECHO MEAS - LA DIMENSION: 3.7 CM
BH CV ECHO MEAS - LAT PEAK E' VEL: 6.4 CM/SEC
BH CV ECHO MEAS - LV DIASTOLIC VOL/BSA (35-75): 28.2 CM2
BH CV ECHO MEAS - LV MASS(C)D: 174.8 GRAMS
BH CV ECHO MEAS - LV SYSTOLIC VOL/BSA (12-30): 11.3 CM2
BH CV ECHO MEAS - LVIDD: 3.8 CM
BH CV ECHO MEAS - LVIDS: 2.07 CM
BH CV ECHO MEAS - LVOT AREA: 3.9 CM2
BH CV ECHO MEAS - LVOT DIAM: 2.23 CM
BH CV ECHO MEAS - LVPWD: 1.22 CM
BH CV ECHO MEAS - MED PEAK E' VEL: 5.5 CM/SEC
BH CV ECHO MEAS - MV A MAX VEL: 89.1 CM/SEC
BH CV ECHO MEAS - MV DEC SLOPE: 228.9 CM/SEC2
BH CV ECHO MEAS - MV E MAX VEL: 76.3 CM/SEC
BH CV ECHO MEAS - MV E/A: 0.86
BH CV ECHO MEAS - RVDD: 3.5 CM
BH CV ECHO MEAS - SI(MOD-SP4): 17 ML/M2
BH CV ECHO MEAS - SV(MOD-SP4): 37.7 ML
BH CV ECHO MEASUREMENTS AVERAGE E/E' RATIO: 12.82
LEFT ATRIUM VOLUME INDEX: 22 ML/M2
MAXIMAL PREDICTED HEART RATE: 137 BPM
STRESS TARGET HR: 116 BPM

## 2022-06-27 ENCOUNTER — TELEPHONE (OUTPATIENT)
Dept: CARDIOLOGY | Facility: CLINIC | Age: 82
End: 2022-06-27

## 2022-06-27 NOTE — TELEPHONE ENCOUNTER
ECHO  Pt notified of no acute findings. Provider will discuss results at f/u. Pt reminded of appt date and time.  ----- Message from Cary Thao MA sent at 6/27/2022  8:57 AM EDT -----    ----- Message -----  From: Raheem Tellez PA  Sent: 6/27/2022   8:56 AM EDT  To: Cary Thao MA    Routine follow-up.

## 2022-11-28 ENCOUNTER — OFFICE VISIT (OUTPATIENT)
Dept: CARDIOLOGY | Facility: CLINIC | Age: 82
End: 2022-11-28

## 2022-11-28 VITALS
HEIGHT: 75 IN | WEIGHT: 200 LBS | DIASTOLIC BLOOD PRESSURE: 77 MMHG | HEART RATE: 69 BPM | OXYGEN SATURATION: 96 % | BODY MASS INDEX: 24.87 KG/M2 | SYSTOLIC BLOOD PRESSURE: 136 MMHG

## 2022-11-28 DIAGNOSIS — R07.2 PRECORDIAL PAIN: ICD-10-CM

## 2022-11-28 DIAGNOSIS — R06.02 SOB (SHORTNESS OF BREATH): Primary | ICD-10-CM

## 2022-11-28 DIAGNOSIS — I10 ESSENTIAL HYPERTENSION: ICD-10-CM

## 2022-11-28 PROCEDURE — 99214 OFFICE O/P EST MOD 30 MIN: CPT | Performed by: PHYSICIAN ASSISTANT

## 2022-11-28 RX ORDER — MECLIZINE HCL 12.5 MG/1
12.5 TABLET ORAL DAILY
COMMUNITY

## 2022-11-28 NOTE — PROGRESS NOTES
Subjective   Marcos Ordaz is a 82 y.o. male     Chief Complaint   Patient presents with   • Follow-up   • Dizziness     Light headed.        HPI  This pleasant gentleman presents back today for evaluation.  We have seen him historically because of orthostasis and symptoms otherwise.  He had a stress test a number of years ago which suggested posterior lateral wall ischemia.  Catheterization was never pursued as the patient was doing well.  Because of mild symptoms at last evaluation he agreed to repeat noninvasive cardiac evaluation.  Stress test was again performed and indicated no evidence of ischemia.  Echo supported normal systolic function with no evidence of structural or valvular abnormalities of any significance.  Marcos is now doing well.  He has no chest pain at this time.  Dyspnea and fatigue remain at baseline.  He has no failure nor dysrhythmic symptoms.  He has no further complaints      Current Outpatient Medications   Medication Sig Dispense Refill   • aspirin 81 MG tablet Take 81 mg by mouth Daily.     • celecoxib (CeleBREX) 200 MG capsule Take 200 mg by mouth Daily.     • doxazosin (CARDURA) 2 MG tablet Take 1/2 tablet daily 90 tablet 3   • finasteride (PROSCAR) 5 MG tablet Take 5 mg by mouth Daily.     • Glucosamine-Chondroit-Vit C-Mn (GLUCOSAMINE 1500 COMPLEX PO) Take 1 tablet by mouth Daily.     • meclizine (ANTIVERT) 12.5 MG tablet Take 12.5 mg by mouth Daily.     • meloxicam (MOBIC) 7.5 MG tablet Take 7.5 mg by mouth Daily.     • omeprazole (priLOSEC) 40 MG capsule Take 40 mg by mouth Daily.     • vitamin B-12 (CYANOCOBALAMIN) 1000 MCG tablet Take 1 tablet by mouth Daily.       No current facility-administered medications for this visit.       Codeine    Past Medical History:   Diagnosis Date   • COVID-19 vaccine administered    • Elevated prostate specific antigen (PSA)        Social History     Socioeconomic History   • Marital status:    Tobacco Use   • Smoking status: Former     Packs/day:  "1.00     Years: 20.00     Pack years: 20.00     Types: Cigarettes   • Smokeless tobacco: Current     Types: Chew   Substance and Sexual Activity   • Alcohol use: No   • Drug use: No   • Sexual activity: Defer       Family History   Problem Relation Age of Onset   • No Known Problems Mother    • No Known Problems Father    • No Known Problems Sister    • No Known Problems Brother        Review of Systems   Constitutional: Negative for activity change, appetite change, chills, fatigue and fever.   HENT: Negative.    Eyes: Negative.  Negative for visual disturbance.   Respiratory: Negative.  Negative for apnea, cough, chest tightness, shortness of breath and wheezing.    Cardiovascular: Negative for chest pain, palpitations and leg swelling.   Gastrointestinal: Negative.  Negative for blood in stool.   Endocrine: Negative.  Negative for cold intolerance and heat intolerance.   Genitourinary: Negative.  Negative for hematuria.   Musculoskeletal: Positive for arthralgias and gait problem. Negative for back pain, joint swelling, neck pain and neck stiffness.   Skin: Negative.  Negative for color change, rash and wound.   Allergic/Immunologic: Negative.  Negative for environmental allergies and food allergies.   Neurological: Positive for dizziness, weakness and light-headedness. Negative for syncope, numbness and headaches.   Hematological: Bruises/bleeds easily.   Psychiatric/Behavioral: Negative.  Negative for sleep disturbance.       Objective     Vitals:    11/28/22 1313   BP: 136/77   BP Location: Left arm   Patient Position: Sitting   Cuff Size: Adult   Pulse: 69   SpO2: 96%   Weight: 90.7 kg (200 lb)   Height: 190.5 cm (75\")        /77 (BP Location: Left arm, Patient Position: Sitting, Cuff Size: Adult)   Pulse 69   Ht 190.5 cm (75\")   Wt 90.7 kg (200 lb)   SpO2 96%   BMI 25.00 kg/m²      Lab Results (most recent)     None          Physical Exam  Vitals and nursing note reviewed.   Constitutional:       " General: He is not in acute distress.     Appearance: He is well-developed.   HENT:      Head: Normocephalic and atraumatic.   Eyes:      Conjunctiva/sclera: Conjunctivae normal.      Pupils: Pupils are equal, round, and reactive to light.   Neck:      Vascular: No JVD.      Trachea: No tracheal deviation.   Cardiovascular:      Rate and Rhythm: Normal rate and regular rhythm.      Heart sounds: Normal heart sounds.   Pulmonary:      Effort: Pulmonary effort is normal.      Breath sounds: Normal breath sounds.   Abdominal:      General: Bowel sounds are normal. There is no distension.      Palpations: Abdomen is soft. There is no mass.      Tenderness: There is no abdominal tenderness. There is no guarding or rebound.   Musculoskeletal:         General: No tenderness or deformity. Normal range of motion.      Cervical back: Normal range of motion and neck supple.   Skin:     General: Skin is warm and dry.      Coloration: Skin is not pale.      Findings: No erythema or rash.   Neurological:      Mental Status: He is alert and oriented to person, place, and time.   Psychiatric:         Behavior: Behavior normal.         Thought Content: Thought content normal.         Judgment: Judgment normal.         Procedure   Procedures         Assessment & Plan      Diagnosis Plan   1. SOB (shortness of breath)        2. Precordial pain        3. Essential hypertension          1.  At this time, the patient appears to be doing well.  Chest pain is resolved.  Dyspnea remains at baseline.  Dizziness is persistent but at baseline as well.    2.  I do feel that a lot of his dizziness is related more to orthostasis.  This is likely at least in part medication related.  He is doing well and I would hesitate to adjust any of his medications at this time.  We will continue that for now and have reviewed orthostatic precautions in detail with him.    3.  Stress and echo studies were benign.  Stress indicates no evidence of ischemia,  which was her main concern.  I do not feel further work-up is warranted.    4.  In that setting, we will make no adjustments.  We have reviewed all medications and test results in detail with this gentleman today.  For change in clinical course he will call immediately.  We will continue to see him on 6-month intervals.      Patient brought in medicine list to appointment, it's been reviewed with patient and med list was updated in the chart.     Advance Care Planning   ACP discussion was declined by the patient. Patient has an advance directive (not in EMR), copy requested.           Electronically signed by:

## 2023-06-01 ENCOUNTER — OFFICE VISIT (OUTPATIENT)
Dept: CARDIOLOGY | Facility: CLINIC | Age: 83
End: 2023-06-01

## 2023-06-01 VITALS
DIASTOLIC BLOOD PRESSURE: 68 MMHG | SYSTOLIC BLOOD PRESSURE: 122 MMHG | OXYGEN SATURATION: 94 % | WEIGHT: 198 LBS | HEIGHT: 75 IN | HEART RATE: 79 BPM | BODY MASS INDEX: 24.62 KG/M2

## 2023-06-01 DIAGNOSIS — R42 DIZZINESS: ICD-10-CM

## 2023-06-01 DIAGNOSIS — I10 ESSENTIAL HYPERTENSION: ICD-10-CM

## 2023-06-01 DIAGNOSIS — R06.02 SOB (SHORTNESS OF BREATH): Primary | ICD-10-CM

## 2023-06-01 PROCEDURE — 1159F MED LIST DOCD IN RCRD: CPT | Performed by: PHYSICIAN ASSISTANT

## 2023-06-01 PROCEDURE — 99213 OFFICE O/P EST LOW 20 MIN: CPT | Performed by: PHYSICIAN ASSISTANT

## 2023-06-01 PROCEDURE — 1160F RVW MEDS BY RX/DR IN RCRD: CPT | Performed by: PHYSICIAN ASSISTANT

## 2023-06-01 PROCEDURE — 93000 ELECTROCARDIOGRAM COMPLETE: CPT | Performed by: PHYSICIAN ASSISTANT

## 2023-06-01 NOTE — PROGRESS NOTES
Subjective   Marcos Ordaz is a 82 y.o. male     Chief Complaint   Patient presents with   • Follow-up     Shortness of breath   Chief complaint: Follow-up symptoms.    HPI    This very pleasant patient presents back today for routine evaluation and follow-up.  He has been followed historically because of orthostatic symptoms, as well as symptoms otherwise.  His prior work-up including stress and echo studies were again reviewed with him.  Stress test sometime ago suggested posterior lateral wall ischemia.  Catheterization was not pursued as the patient was doing well.  Eventually, noninvasive cardiac evaluation was recommended which she had about a year ago.  Stress test at that time indicated no evidence of ischemia with normal post stress EF.  Echo supported preserved systolic function with no significant valvular nor structural abnormalities.  Today, the patient tells me he has done well.  He denies chest pain.  Dyspnea is at baseline.  Fatigue remains at baseline as well.  He has no failure symptoms.  Dizziness continues to be an issue.  He is concerned that this is potentially vertigo.  He really does not describe vertigo.  He still has predominantly orthostatic symptoms.  He has requested ENT evaluation and this apparently has been scheduled.  The patient is typically normotensive.  He has no further complaints otherwise at this time.    Current Outpatient Medications   Medication Sig Dispense Refill   • aspirin 81 MG tablet Take 1 tablet by mouth Daily.     • celecoxib (CeleBREX) 200 MG capsule Take 1 capsule by mouth Daily.     • doxazosin (CARDURA) 2 MG tablet Take 1/2 tablet daily 90 tablet 3   • finasteride (PROSCAR) 5 MG tablet Take 1 tablet by mouth Daily.     • Glucosamine-Chondroit-Vit C-Mn (GLUCOSAMINE 1500 COMPLEX PO) Take 1 tablet by mouth Daily.     • meclizine (ANTIVERT) 12.5 MG tablet Take 1 tablet by mouth Daily.     • meloxicam (MOBIC) 7.5 MG tablet Take 1 tablet by mouth Daily.     • omeprazole  (priLOSEC) 40 MG capsule Take 1 capsule by mouth Daily.     • vitamin B-12 (CYANOCOBALAMIN) 1000 MCG tablet Take 1 tablet by mouth Daily.       No current facility-administered medications for this visit.       Codeine    Past Medical History:   Diagnosis Date   • COVID-19 vaccine administered    • Elevated prostate specific antigen (PSA)        Social History     Socioeconomic History   • Marital status:    Tobacco Use   • Smoking status: Former     Packs/day: 1.00     Years: 20.00     Pack years: 20.00     Types: Cigarettes   • Smokeless tobacco: Current     Types: Chew   Substance and Sexual Activity   • Alcohol use: No   • Drug use: No   • Sexual activity: Defer       Family History   Problem Relation Age of Onset   • No Known Problems Mother    • No Known Problems Father    • No Known Problems Sister    • No Known Problems Brother        Review of Systems   Constitutional: Negative.  Negative for activity change, appetite change, fatigue and fever.   HENT: Negative.    Eyes: Negative.  Negative for visual disturbance.   Respiratory: Negative.  Negative for apnea, cough, chest tightness, shortness of breath and wheezing.    Cardiovascular: Negative.  Negative for chest pain, palpitations and leg swelling.   Gastrointestinal: Negative for blood in stool.   Endocrine: Negative.  Negative for cold intolerance and heat intolerance.   Genitourinary: Negative.  Negative for hematuria.   Skin: Negative.  Negative for color change, rash and wound.   Allergic/Immunologic: Negative.  Negative for environmental allergies and food allergies.   Neurological: Positive for dizziness. Negative for syncope, weakness, light-headedness, numbness and headaches.   Hematological: Bruises/bleeds easily.   Psychiatric/Behavioral: Negative.  Negative for sleep disturbance.       Objective     Vitals:    06/01/23 1317   BP: 122/68   BP Location: Left arm   Patient Position: Sitting   Cuff Size: Adult   Pulse: 79   SpO2: 94%  "  Weight: 89.8 kg (198 lb)   Height: 190.5 cm (75\")        /68 (BP Location: Left arm, Patient Position: Sitting, Cuff Size: Adult)   Pulse 79   Ht 190.5 cm (75\")   Wt 89.8 kg (198 lb)   SpO2 94%   BMI 24.75 kg/m²      Lab Results (most recent)     None          Physical Exam  Vitals and nursing note reviewed.   Constitutional:       General: He is not in acute distress.     Appearance: He is well-developed.   HENT:      Head: Normocephalic and atraumatic.   Eyes:      Conjunctiva/sclera: Conjunctivae normal.      Pupils: Pupils are equal, round, and reactive to light.   Neck:      Vascular: No JVD.      Trachea: No tracheal deviation.   Cardiovascular:      Rate and Rhythm: Normal rate and regular rhythm.      Heart sounds: Normal heart sounds.   Pulmonary:      Effort: Pulmonary effort is normal.      Breath sounds: Normal breath sounds.   Abdominal:      General: Bowel sounds are normal. There is no distension.      Palpations: Abdomen is soft. There is no mass.      Tenderness: There is no abdominal tenderness. There is no guarding or rebound.   Musculoskeletal:         General: No tenderness or deformity. Normal range of motion.      Cervical back: Normal range of motion and neck supple.   Skin:     General: Skin is warm and dry.      Coloration: Skin is not pale.      Findings: No erythema or rash.   Neurological:      Mental Status: He is alert and oriented to person, place, and time.   Psychiatric:         Behavior: Behavior normal.         Thought Content: Thought content normal.         Judgment: Judgment normal.         Procedure     ECG 12 Lead    Date/Time: 6/1/2023 1:16 PM  Performed by: Raheem Tellez PA  Authorized by: Raheem Tellez PA   Comparison: compared with previous ECG from 4/28/2022                   Assessment & Plan      Diagnosis Plan   1. SOB (shortness of breath)        2. Essential hypertension        3. Dizziness          1.  At this time, the patient feels that he is " doing well from cardiovascular standpoint.  Recent stress and echo studies were reviewed with the patient.  Stress indicated no evidence of ischemia.  Echo indicated preserved systolic function with mostly stable parameters otherwise.  As he feels he is doing well from general cardiovascular standpoint, nothing further is indicated at this time.    2.  He has recurrent episodes of dizziness noted.  Clearly, episodes at times are representative of orthostatic changes.  He is well aware of physical maneuvers to address the same.  We offered adjustment of medical regimen to address the same.  He would like to pursue ENT evaluation first.  He tells me that he wants ENT evaluation to ensure no abnormalities.  If nothing significant is found, he would then consider the same.    3.  In this setting, at the patient's request, nothing further and we will continue to see him on 6-month intervals.  He will call for complications.         Advance Care Planning   ACP discussion was declined by the patient. Patient has an advance directive (not in EMR), copy requested.     Patient did not bring med list or medicine bottles to appointment, med list has been reviewed and updated based on patient's knowledge of their meds.        Electronically signed by:

## 2024-01-15 ENCOUNTER — TELEPHONE (OUTPATIENT)
Dept: CARDIOLOGY | Facility: CLINIC | Age: 84
End: 2024-01-15

## 2024-09-18 ENCOUNTER — OFFICE VISIT (OUTPATIENT)
Dept: CARDIOLOGY | Facility: CLINIC | Age: 84
End: 2024-09-18
Payer: MEDICARE

## 2024-09-18 VITALS
BODY MASS INDEX: 24.74 KG/M2 | HEART RATE: 74 BPM | SYSTOLIC BLOOD PRESSURE: 135 MMHG | OXYGEN SATURATION: 96 % | DIASTOLIC BLOOD PRESSURE: 75 MMHG | HEIGHT: 75 IN | WEIGHT: 199 LBS

## 2024-09-18 DIAGNOSIS — R06.09 DYSPNEA ON EXERTION: Primary | ICD-10-CM

## 2024-09-18 DIAGNOSIS — R53.83 OTHER FATIGUE: ICD-10-CM

## 2024-09-18 DIAGNOSIS — I10 PRIMARY HYPERTENSION: ICD-10-CM

## 2024-09-18 PROCEDURE — 1160F RVW MEDS BY RX/DR IN RCRD: CPT | Performed by: PHYSICIAN ASSISTANT

## 2024-09-18 PROCEDURE — 99213 OFFICE O/P EST LOW 20 MIN: CPT | Performed by: PHYSICIAN ASSISTANT

## 2024-09-18 PROCEDURE — 1159F MED LIST DOCD IN RCRD: CPT | Performed by: PHYSICIAN ASSISTANT

## 2024-09-18 PROCEDURE — 93000 ELECTROCARDIOGRAM COMPLETE: CPT | Performed by: PHYSICIAN ASSISTANT

## 2024-09-18 RX ORDER — DOXAZOSIN 4 MG/1
4 TABLET ORAL NIGHTLY
COMMUNITY

## 2025-06-03 ENCOUNTER — OFFICE VISIT (OUTPATIENT)
Dept: CARDIOLOGY | Facility: CLINIC | Age: 85
End: 2025-06-03
Payer: MEDICARE

## 2025-06-03 VITALS
BODY MASS INDEX: 23.87 KG/M2 | HEIGHT: 75 IN | WEIGHT: 192 LBS | HEART RATE: 80 BPM | DIASTOLIC BLOOD PRESSURE: 81 MMHG | SYSTOLIC BLOOD PRESSURE: 149 MMHG | OXYGEN SATURATION: 96 %

## 2025-06-03 DIAGNOSIS — R53.83 OTHER FATIGUE: ICD-10-CM

## 2025-06-03 DIAGNOSIS — I10 PRIMARY HYPERTENSION: ICD-10-CM

## 2025-06-03 DIAGNOSIS — R06.09 DYSPNEA ON EXERTION: Primary | ICD-10-CM

## 2025-06-03 PROCEDURE — 1159F MED LIST DOCD IN RCRD: CPT | Performed by: PHYSICIAN ASSISTANT

## 2025-06-03 PROCEDURE — 99213 OFFICE O/P EST LOW 20 MIN: CPT | Performed by: PHYSICIAN ASSISTANT

## 2025-06-03 PROCEDURE — 1160F RVW MEDS BY RX/DR IN RCRD: CPT | Performed by: PHYSICIAN ASSISTANT

## 2025-06-03 NOTE — PROGRESS NOTES
Problem list     Subjective   Marcos Ordaz is a 84 y.o. male     Chief Complaint   Patient presents with    Follow-up     8 month follow up        HPI  The patient presents in clinic today for routine evaluation and follow-up.  We have seen the patient historically because of dizziness, dyspnea, fatigue, and issues otherwise.  He was eventually scheduled for stress and echo.  Stress test had suggested posterior lateral wall ischemia.  He was doing so well that he did not want catheterization.  We eventually did a follow-up stress test which would have been done 2 to 3 years ago.  Stress indicated no evidence of ischemia.  Echo indicated normal systolic function with no significant valvular or structural abnormalities.  He is now doing well.  His main issue is with the vertigo.  This is being evaluated treated with his primary care.  He recently had physical therapy to address vertigo and feels markedly improved.  He has no angina.  Dyspnea is at baseline.  He has no further complaints.    Current Outpatient Medications on File Prior to Visit   Medication Sig Dispense Refill    aspirin 81 MG tablet Take 1 tablet by mouth Daily.      celecoxib (CeleBREX) 200 MG capsule Take 1 capsule by mouth Daily.      doxazosin (CARDURA) 4 MG tablet Take 1 tablet by mouth Every Night.      finasteride (PROSCAR) 5 MG tablet Take 1 tablet by mouth Daily.      Glucosamine-Chondroit-Vit C-Mn (GLUCOSAMINE 1500 COMPLEX PO) Take 1 tablet by mouth Daily.      meclizine (ANTIVERT) 12.5 MG tablet Take 1 tablet by mouth Daily.      meloxicam (MOBIC) 7.5 MG tablet Take 1 tablet by mouth Daily.      omeprazole (priLOSEC) 40 MG capsule Take 1 capsule by mouth Daily.      vitamin B-12 (CYANOCOBALAMIN) 1000 MCG tablet Take 1 tablet by mouth Daily.       No current facility-administered medications on file prior to visit.       Codeine    Past Medical History:   Diagnosis Date    COVID-19 vaccine administered     Elevated prostate specific antigen (PSA)  "       Social History     Socioeconomic History    Marital status:    Tobacco Use    Smoking status: Former     Current packs/day: 1.00     Average packs/day: 1 pack/day for 20.0 years (20.0 ttl pk-yrs)     Types: Cigarettes    Smokeless tobacco: Current     Types: Chew   Vaping Use    Vaping status: Never Used   Substance and Sexual Activity    Alcohol use: No    Drug use: No    Sexual activity: Defer       Family History   Problem Relation Age of Onset    No Known Problems Mother     No Known Problems Father     No Known Problems Sister     No Known Problems Brother        Review of Systems   Constitutional: Negative.  Negative for chills, diaphoresis, fatigue and fever.   HENT: Negative.  Negative for hearing loss.    Eyes: Negative.  Negative for visual disturbance.   Respiratory: Negative.  Negative for apnea, cough, chest tightness, shortness of breath and wheezing.    Cardiovascular: Negative.  Negative for chest pain, palpitations and leg swelling.   Gastrointestinal: Negative.  Negative for abdominal pain and blood in stool.   Endocrine: Negative.    Genitourinary: Negative.  Negative for hematuria.   Musculoskeletal:  Positive for arthralgias. Negative for back pain, myalgias, neck pain and neck stiffness.   Skin: Negative.  Negative for rash and wound.   Allergic/Immunologic: Negative.  Negative for environmental allergies and food allergies.   Neurological:  Positive for dizziness. Negative for syncope, weakness, light-headedness, numbness and headaches (vertigo).   Hematological:  Bruises/bleeds easily.   Psychiatric/Behavioral: Negative.  Negative for sleep disturbance.        Objective   Vitals:    06/03/25 1405   BP: 149/81   Pulse: 80   SpO2: 96%   Weight: 87.1 kg (192 lb)   Height: 190.5 cm (75\")      /81   Pulse 80   Ht 190.5 cm (75\")   Wt 87.1 kg (192 lb)   SpO2 96%   BMI 24.00 kg/m²    Lab Results (most recent)       None          Physical Exam  Vitals and nursing note " reviewed.   Constitutional:       General: He is not in acute distress.     Appearance: He is well-developed.   HENT:      Head: Normocephalic and atraumatic.   Eyes:      Conjunctiva/sclera: Conjunctivae normal.      Pupils: Pupils are equal, round, and reactive to light.   Neck:      Vascular: No JVD.      Trachea: No tracheal deviation.   Cardiovascular:      Rate and Rhythm: Normal rate and regular rhythm.      Heart sounds: Normal heart sounds.   Pulmonary:      Effort: Pulmonary effort is normal.      Breath sounds: Normal breath sounds.   Abdominal:      General: Bowel sounds are normal. There is no distension.      Palpations: Abdomen is soft. There is no mass.      Tenderness: There is no abdominal tenderness. There is no guarding or rebound.   Musculoskeletal:         General: No tenderness or deformity. Normal range of motion.      Cervical back: Normal range of motion and neck supple.   Skin:     General: Skin is warm and dry.      Coloration: Skin is not pale.      Findings: No erythema or rash.   Neurological:      Mental Status: He is alert and oriented to person, place, and time.   Psychiatric:         Behavior: Behavior normal.         Thought Content: Thought content normal.         Judgment: Judgment normal.           Procedure   Procedures       Assessment & Plan      Diagnosis Plan   1. Dyspnea on exertion        2. Other fatigue        3. Primary hypertension          1.  At this time, the patient appears to be doing well.  Dyspnea and fatigue remain at baseline.  He denies angina, failure, or dysrhythmic issues otherwise.    2.  Blood pressures are well-maintained on current medical regimen.  I would make no adjustments.    3.  Previous stress and echo studies were unremarkable.  I feel that he is doing well enough that we can avoid further workup for now.  For change in clinical course he will return immediately.           Marcos Ordaz  reports that he has quit smoking. His smoking use included  cigarettes. He has a 20 pack-year smoking history. His smokeless tobacco use includes chew. I have educated him on the risk of diseases from using tobacco products such as cancer, COPD, and heart disease.     I advised him to quit and he is not willing to quit.    I spent 3  minutes counseling the patient.          Advance Care Planning   ACP discussion was held with the patient during this visit. Patient has an advance directive (not in EMR), copy requested.                Electronically signed by: